# Patient Record
Sex: MALE | Race: WHITE | NOT HISPANIC OR LATINO | URBAN - METROPOLITAN AREA
[De-identification: names, ages, dates, MRNs, and addresses within clinical notes are randomized per-mention and may not be internally consistent; named-entity substitution may affect disease eponyms.]

---

## 2017-04-11 ENCOUNTER — OUTPATIENT (OUTPATIENT)
Dept: OUTPATIENT SERVICES | Facility: HOSPITAL | Age: 82
LOS: 1 days | Discharge: HOME | End: 2017-04-11

## 2017-06-17 ENCOUNTER — INPATIENT (INPATIENT)
Facility: HOSPITAL | Age: 82
LOS: 2 days | Discharge: HOME | End: 2017-06-20
Attending: INTERNAL MEDICINE

## 2017-06-17 DIAGNOSIS — M17.10 UNILATERAL PRIMARY OSTEOARTHRITIS, UNSPECIFIED KNEE: ICD-10-CM
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
## 2017-06-17 DIAGNOSIS — R07.9 CHEST PAIN, UNSPECIFIED: ICD-10-CM

## 2017-06-17 DIAGNOSIS — I50.9 HEART FAILURE, UNSPECIFIED: ICD-10-CM

## 2017-06-17 DIAGNOSIS — K21.9 GASTRO-ESOPHAGEAL REFLUX DISEASE WITHOUT ESOPHAGITIS: ICD-10-CM

## 2017-06-17 DIAGNOSIS — R55 SYNCOPE AND COLLAPSE: ICD-10-CM

## 2017-06-17 DIAGNOSIS — T84.84XA PAIN DUE TO INTERNAL ORTHOPEDIC PROSTHETIC DEVICES, IMPLANTS AND GRAFTS, INITIAL ENCOUNTER: ICD-10-CM

## 2017-06-28 DIAGNOSIS — M19.90 UNSPECIFIED OSTEOARTHRITIS, UNSPECIFIED SITE: ICD-10-CM

## 2017-06-28 DIAGNOSIS — Z79.82 LONG TERM (CURRENT) USE OF ASPIRIN: ICD-10-CM

## 2017-06-28 DIAGNOSIS — M62.82 RHABDOMYOLYSIS: ICD-10-CM

## 2017-06-28 DIAGNOSIS — Z96.649 PRESENCE OF UNSPECIFIED ARTIFICIAL HIP JOINT: ICD-10-CM

## 2017-06-28 DIAGNOSIS — N17.9 ACUTE KIDNEY FAILURE, UNSPECIFIED: ICD-10-CM

## 2017-06-28 DIAGNOSIS — K21.9 GASTRO-ESOPHAGEAL REFLUX DISEASE WITHOUT ESOPHAGITIS: ICD-10-CM

## 2017-06-28 DIAGNOSIS — Z96.652 PRESENCE OF LEFT ARTIFICIAL KNEE JOINT: ICD-10-CM

## 2017-06-28 DIAGNOSIS — M25.562 PAIN IN LEFT KNEE: ICD-10-CM

## 2017-06-28 DIAGNOSIS — I50.9 HEART FAILURE, UNSPECIFIED: ICD-10-CM

## 2017-06-28 DIAGNOSIS — M00.862 ARTHRITIS DUE TO OTHER BACTERIA, LEFT KNEE: ICD-10-CM

## 2017-06-28 DIAGNOSIS — T81.4XXA INFECTION FOLLOWING A PROCEDURE, INITIAL ENCOUNTER: ICD-10-CM

## 2017-06-28 DIAGNOSIS — I25.10 ATHEROSCLEROTIC HEART DISEASE OF NATIVE CORONARY ARTERY WITHOUT ANGINA PECTORIS: ICD-10-CM

## 2017-06-28 DIAGNOSIS — R55 SYNCOPE AND COLLAPSE: ICD-10-CM

## 2017-06-28 DIAGNOSIS — N18.9 CHRONIC KIDNEY DISEASE, UNSPECIFIED: ICD-10-CM

## 2017-06-28 DIAGNOSIS — I13.0 HYPERTENSIVE HEART AND CHRONIC KIDNEY DISEASE WITH HEART FAILURE AND STAGE 1 THROUGH STAGE 4 CHRONIC KIDNEY DISEASE, OR UNSPECIFIED CHRONIC KIDNEY DISEASE: ICD-10-CM

## 2017-06-28 DIAGNOSIS — E78.5 HYPERLIPIDEMIA, UNSPECIFIED: ICD-10-CM

## 2017-06-28 DIAGNOSIS — J44.9 CHRONIC OBSTRUCTIVE PULMONARY DISEASE, UNSPECIFIED: ICD-10-CM

## 2017-07-03 DIAGNOSIS — I50.32 CHRONIC DIASTOLIC (CONGESTIVE) HEART FAILURE: ICD-10-CM

## 2017-07-03 DIAGNOSIS — T84.84XA PAIN DUE TO INTERNAL ORTHOPEDIC PROSTHETIC DEVICES, IMPLANTS AND GRAFTS, INITIAL ENCOUNTER: ICD-10-CM

## 2017-07-03 DIAGNOSIS — T81.4XXA INFECTION FOLLOWING A PROCEDURE, INITIAL ENCOUNTER: ICD-10-CM

## 2017-07-03 DIAGNOSIS — M25.462 EFFUSION, LEFT KNEE: ICD-10-CM

## 2017-07-18 ENCOUNTER — OUTPATIENT (OUTPATIENT)
Dept: OUTPATIENT SERVICES | Facility: HOSPITAL | Age: 82
LOS: 1 days | Discharge: HOME | End: 2017-07-18

## 2017-07-18 DIAGNOSIS — Z96.652 PRESENCE OF LEFT ARTIFICIAL KNEE JOINT: ICD-10-CM

## 2017-07-18 DIAGNOSIS — K21.9 GASTRO-ESOPHAGEAL REFLUX DISEASE WITHOUT ESOPHAGITIS: ICD-10-CM

## 2017-07-18 DIAGNOSIS — M25.662 STIFFNESS OF LEFT KNEE, NOT ELSEWHERE CLASSIFIED: ICD-10-CM

## 2017-07-18 DIAGNOSIS — M17.10 UNILATERAL PRIMARY OSTEOARTHRITIS, UNSPECIFIED KNEE: ICD-10-CM

## 2017-07-18 DIAGNOSIS — R55 SYNCOPE AND COLLAPSE: ICD-10-CM

## 2017-07-18 DIAGNOSIS — T84.84XA PAIN DUE TO INTERNAL ORTHOPEDIC PROSTHETIC DEVICES, IMPLANTS AND GRAFTS, INITIAL ENCOUNTER: ICD-10-CM

## 2017-07-18 DIAGNOSIS — I50.9 HEART FAILURE, UNSPECIFIED: ICD-10-CM

## 2017-07-18 DIAGNOSIS — R07.9 CHEST PAIN, UNSPECIFIED: ICD-10-CM

## 2017-07-26 ENCOUNTER — OUTPATIENT (OUTPATIENT)
Dept: OUTPATIENT SERVICES | Facility: HOSPITAL | Age: 82
LOS: 1 days | Discharge: HOME | End: 2017-07-26

## 2017-07-26 DIAGNOSIS — Z96.652 PRESENCE OF LEFT ARTIFICIAL KNEE JOINT: ICD-10-CM

## 2017-09-19 ENCOUNTER — OUTPATIENT (OUTPATIENT)
Dept: OUTPATIENT SERVICES | Facility: HOSPITAL | Age: 82
LOS: 1 days | Discharge: HOME | End: 2017-09-19

## 2017-09-19 DIAGNOSIS — I50.9 HEART FAILURE, UNSPECIFIED: ICD-10-CM

## 2017-09-19 DIAGNOSIS — K21.9 GASTRO-ESOPHAGEAL REFLUX DISEASE WITHOUT ESOPHAGITIS: ICD-10-CM

## 2017-09-19 DIAGNOSIS — Z00.00 ENCOUNTER FOR GENERAL ADULT MEDICAL EXAMINATION WITHOUT ABNORMAL FINDINGS: ICD-10-CM

## 2017-09-19 DIAGNOSIS — T84.84XA PAIN DUE TO INTERNAL ORTHOPEDIC PROSTHETIC DEVICES, IMPLANTS AND GRAFTS, INITIAL ENCOUNTER: ICD-10-CM

## 2017-09-19 DIAGNOSIS — M17.10 UNILATERAL PRIMARY OSTEOARTHRITIS, UNSPECIFIED KNEE: ICD-10-CM

## 2017-09-19 DIAGNOSIS — R55 SYNCOPE AND COLLAPSE: ICD-10-CM

## 2017-09-19 DIAGNOSIS — R07.9 CHEST PAIN, UNSPECIFIED: ICD-10-CM

## 2017-09-26 ENCOUNTER — OUTPATIENT (OUTPATIENT)
Dept: OUTPATIENT SERVICES | Facility: HOSPITAL | Age: 82
LOS: 1 days | Discharge: HOME | End: 2017-09-26

## 2017-09-26 DIAGNOSIS — D51.0 VITAMIN B12 DEFICIENCY ANEMIA DUE TO INTRINSIC FACTOR DEFICIENCY: ICD-10-CM

## 2017-09-26 DIAGNOSIS — E11.9 TYPE 2 DIABETES MELLITUS WITHOUT COMPLICATIONS: ICD-10-CM

## 2017-09-26 DIAGNOSIS — N39.0 URINARY TRACT INFECTION, SITE NOT SPECIFIED: ICD-10-CM

## 2017-09-26 DIAGNOSIS — R55 SYNCOPE AND COLLAPSE: ICD-10-CM

## 2017-09-26 DIAGNOSIS — E03.9 HYPOTHYROIDISM, UNSPECIFIED: ICD-10-CM

## 2017-09-26 DIAGNOSIS — E78.5 HYPERLIPIDEMIA, UNSPECIFIED: ICD-10-CM

## 2017-09-26 DIAGNOSIS — T84.84XA PAIN DUE TO INTERNAL ORTHOPEDIC PROSTHETIC DEVICES, IMPLANTS AND GRAFTS, INITIAL ENCOUNTER: ICD-10-CM

## 2017-09-26 DIAGNOSIS — I50.9 HEART FAILURE, UNSPECIFIED: ICD-10-CM

## 2017-09-26 DIAGNOSIS — M17.10 UNILATERAL PRIMARY OSTEOARTHRITIS, UNSPECIFIED KNEE: ICD-10-CM

## 2017-09-26 DIAGNOSIS — Z00.00 ENCOUNTER FOR GENERAL ADULT MEDICAL EXAMINATION WITHOUT ABNORMAL FINDINGS: ICD-10-CM

## 2017-09-26 DIAGNOSIS — R07.9 CHEST PAIN, UNSPECIFIED: ICD-10-CM

## 2017-09-26 DIAGNOSIS — D53.9 NUTRITIONAL ANEMIA, UNSPECIFIED: ICD-10-CM

## 2017-09-26 DIAGNOSIS — D51.8 OTHER VITAMIN B12 DEFICIENCY ANEMIAS: ICD-10-CM

## 2017-09-26 DIAGNOSIS — K21.9 GASTRO-ESOPHAGEAL REFLUX DISEASE WITHOUT ESOPHAGITIS: ICD-10-CM

## 2017-11-14 ENCOUNTER — OUTPATIENT (OUTPATIENT)
Dept: OUTPATIENT SERVICES | Facility: HOSPITAL | Age: 82
LOS: 1 days | Discharge: HOME | End: 2017-11-14

## 2017-11-14 DIAGNOSIS — I10 ESSENTIAL (PRIMARY) HYPERTENSION: ICD-10-CM

## 2017-11-14 DIAGNOSIS — Z01.818 ENCOUNTER FOR OTHER PREPROCEDURAL EXAMINATION: ICD-10-CM

## 2017-11-14 DIAGNOSIS — M16.12 UNILATERAL PRIMARY OSTEOARTHRITIS, LEFT HIP: ICD-10-CM

## 2017-11-14 DIAGNOSIS — K21.9 GASTRO-ESOPHAGEAL REFLUX DISEASE WITHOUT ESOPHAGITIS: ICD-10-CM

## 2017-11-14 DIAGNOSIS — I50.9 HEART FAILURE, UNSPECIFIED: ICD-10-CM

## 2017-11-14 DIAGNOSIS — R55 SYNCOPE AND COLLAPSE: ICD-10-CM

## 2017-11-14 DIAGNOSIS — M17.10 UNILATERAL PRIMARY OSTEOARTHRITIS, UNSPECIFIED KNEE: ICD-10-CM

## 2017-11-14 DIAGNOSIS — R07.9 CHEST PAIN, UNSPECIFIED: ICD-10-CM

## 2017-11-14 DIAGNOSIS — I25.10 ATHEROSCLEROTIC HEART DISEASE OF NATIVE CORONARY ARTERY WITHOUT ANGINA PECTORIS: ICD-10-CM

## 2017-11-14 DIAGNOSIS — T84.84XA PAIN DUE TO INTERNAL ORTHOPEDIC PROSTHETIC DEVICES, IMPLANTS AND GRAFTS, INITIAL ENCOUNTER: ICD-10-CM

## 2017-11-14 DIAGNOSIS — J44.9 CHRONIC OBSTRUCTIVE PULMONARY DISEASE, UNSPECIFIED: ICD-10-CM

## 2017-12-05 ENCOUNTER — INPATIENT (INPATIENT)
Facility: HOSPITAL | Age: 82
LOS: 0 days | Discharge: HOME | End: 2017-12-05
Attending: ORTHOPAEDIC SURGERY

## 2017-12-05 DIAGNOSIS — R07.9 CHEST PAIN, UNSPECIFIED: ICD-10-CM

## 2017-12-05 DIAGNOSIS — K21.9 GASTRO-ESOPHAGEAL REFLUX DISEASE WITHOUT ESOPHAGITIS: ICD-10-CM

## 2017-12-05 DIAGNOSIS — I50.9 HEART FAILURE, UNSPECIFIED: ICD-10-CM

## 2017-12-05 DIAGNOSIS — T84.84XA PAIN DUE TO INTERNAL ORTHOPEDIC PROSTHETIC DEVICES, IMPLANTS AND GRAFTS, INITIAL ENCOUNTER: ICD-10-CM

## 2017-12-05 DIAGNOSIS — R55 SYNCOPE AND COLLAPSE: ICD-10-CM

## 2017-12-05 DIAGNOSIS — M17.10 UNILATERAL PRIMARY OSTEOARTHRITIS, UNSPECIFIED KNEE: ICD-10-CM

## 2017-12-11 DIAGNOSIS — Z96.641 PRESENCE OF RIGHT ARTIFICIAL HIP JOINT: ICD-10-CM

## 2017-12-11 DIAGNOSIS — I11.0 HYPERTENSIVE HEART DISEASE WITH HEART FAILURE: ICD-10-CM

## 2017-12-11 DIAGNOSIS — K21.9 GASTRO-ESOPHAGEAL REFLUX DISEASE WITHOUT ESOPHAGITIS: ICD-10-CM

## 2017-12-11 DIAGNOSIS — I25.10 ATHEROSCLEROTIC HEART DISEASE OF NATIVE CORONARY ARTERY WITHOUT ANGINA PECTORIS: ICD-10-CM

## 2017-12-11 DIAGNOSIS — M16.12 UNILATERAL PRIMARY OSTEOARTHRITIS, LEFT HIP: ICD-10-CM

## 2017-12-11 DIAGNOSIS — J44.9 CHRONIC OBSTRUCTIVE PULMONARY DISEASE, UNSPECIFIED: ICD-10-CM

## 2017-12-11 DIAGNOSIS — Z53.8 PROCEDURE AND TREATMENT NOT CARRIED OUT FOR OTHER REASONS: ICD-10-CM

## 2017-12-11 DIAGNOSIS — E78.00 PURE HYPERCHOLESTEROLEMIA, UNSPECIFIED: ICD-10-CM

## 2017-12-11 DIAGNOSIS — I25.2 OLD MYOCARDIAL INFARCTION: ICD-10-CM

## 2017-12-11 DIAGNOSIS — R21 RASH AND OTHER NONSPECIFIC SKIN ERUPTION: ICD-10-CM

## 2017-12-11 DIAGNOSIS — Z86.12 PERSONAL HISTORY OF POLIOMYELITIS: ICD-10-CM

## 2017-12-11 DIAGNOSIS — L08.9 LOCAL INFECTION OF THE SKIN AND SUBCUTANEOUS TISSUE, UNSPECIFIED: ICD-10-CM

## 2017-12-11 DIAGNOSIS — D64.9 ANEMIA, UNSPECIFIED: ICD-10-CM

## 2017-12-11 DIAGNOSIS — I50.32 CHRONIC DIASTOLIC (CONGESTIVE) HEART FAILURE: ICD-10-CM

## 2017-12-11 DIAGNOSIS — Z96.653 PRESENCE OF ARTIFICIAL KNEE JOINT, BILATERAL: ICD-10-CM

## 2017-12-18 ENCOUNTER — OUTPATIENT (OUTPATIENT)
Dept: OUTPATIENT SERVICES | Facility: HOSPITAL | Age: 82
LOS: 1 days | Discharge: HOME | End: 2017-12-18

## 2017-12-18 DIAGNOSIS — M17.10 UNILATERAL PRIMARY OSTEOARTHRITIS, UNSPECIFIED KNEE: ICD-10-CM

## 2017-12-18 DIAGNOSIS — R07.9 CHEST PAIN, UNSPECIFIED: ICD-10-CM

## 2017-12-18 DIAGNOSIS — K21.9 GASTRO-ESOPHAGEAL REFLUX DISEASE WITHOUT ESOPHAGITIS: ICD-10-CM

## 2017-12-18 DIAGNOSIS — T84.84XA PAIN DUE TO INTERNAL ORTHOPEDIC PROSTHETIC DEVICES, IMPLANTS AND GRAFTS, INITIAL ENCOUNTER: ICD-10-CM

## 2017-12-18 DIAGNOSIS — Z00.00 ENCOUNTER FOR GENERAL ADULT MEDICAL EXAMINATION WITHOUT ABNORMAL FINDINGS: ICD-10-CM

## 2017-12-18 DIAGNOSIS — R55 SYNCOPE AND COLLAPSE: ICD-10-CM

## 2017-12-18 DIAGNOSIS — I50.9 HEART FAILURE, UNSPECIFIED: ICD-10-CM

## 2017-12-27 ENCOUNTER — OUTPATIENT (OUTPATIENT)
Dept: OUTPATIENT SERVICES | Facility: HOSPITAL | Age: 82
LOS: 1 days | Discharge: HOME | End: 2017-12-27

## 2017-12-27 DIAGNOSIS — Z01.818 ENCOUNTER FOR OTHER PREPROCEDURAL EXAMINATION: ICD-10-CM

## 2017-12-27 DIAGNOSIS — M16.12 UNILATERAL PRIMARY OSTEOARTHRITIS, LEFT HIP: ICD-10-CM

## 2017-12-27 DIAGNOSIS — M16.2 BILATERAL OSTEOARTHRITIS RESULTING FROM HIP DYSPLASIA: ICD-10-CM

## 2017-12-27 DIAGNOSIS — R07.9 CHEST PAIN, UNSPECIFIED: ICD-10-CM

## 2017-12-27 DIAGNOSIS — I50.9 HEART FAILURE, UNSPECIFIED: ICD-10-CM

## 2017-12-27 DIAGNOSIS — R55 SYNCOPE AND COLLAPSE: ICD-10-CM

## 2017-12-27 DIAGNOSIS — M17.10 UNILATERAL PRIMARY OSTEOARTHRITIS, UNSPECIFIED KNEE: ICD-10-CM

## 2017-12-27 DIAGNOSIS — K21.9 GASTRO-ESOPHAGEAL REFLUX DISEASE WITHOUT ESOPHAGITIS: ICD-10-CM

## 2017-12-27 DIAGNOSIS — T84.84XA PAIN DUE TO INTERNAL ORTHOPEDIC PROSTHETIC DEVICES, IMPLANTS AND GRAFTS, INITIAL ENCOUNTER: ICD-10-CM

## 2018-01-09 ENCOUNTER — INPATIENT (INPATIENT)
Facility: HOSPITAL | Age: 83
LOS: 6 days | Discharge: NURSING, REHAB OR LONG-TERM CARE | End: 2018-01-16
Attending: ORTHOPAEDIC SURGERY

## 2018-01-09 DIAGNOSIS — M17.10 UNILATERAL PRIMARY OSTEOARTHRITIS, UNSPECIFIED KNEE: ICD-10-CM

## 2018-01-09 DIAGNOSIS — T84.84XA PAIN DUE TO INTERNAL ORTHOPEDIC PROSTHETIC DEVICES, IMPLANTS AND GRAFTS, INITIAL ENCOUNTER: ICD-10-CM

## 2018-01-09 DIAGNOSIS — R55 SYNCOPE AND COLLAPSE: ICD-10-CM

## 2018-01-09 DIAGNOSIS — K21.9 GASTRO-ESOPHAGEAL REFLUX DISEASE WITHOUT ESOPHAGITIS: ICD-10-CM

## 2018-01-09 DIAGNOSIS — I50.9 HEART FAILURE, UNSPECIFIED: ICD-10-CM

## 2018-01-09 DIAGNOSIS — R07.9 CHEST PAIN, UNSPECIFIED: ICD-10-CM

## 2018-01-10 DIAGNOSIS — I25.10 ATHEROSCLEROTIC HEART DISEASE OF NATIVE CORONARY ARTERY WITHOUT ANGINA PECTORIS: ICD-10-CM

## 2018-01-10 DIAGNOSIS — I50.9 HEART FAILURE, UNSPECIFIED: ICD-10-CM

## 2018-01-10 DIAGNOSIS — J30.9 ALLERGIC RHINITIS, UNSPECIFIED: ICD-10-CM

## 2018-01-10 DIAGNOSIS — Z45.9 ENCOUNTER FOR ADJUSTMENT AND MANAGEMENT OF UNSPECIFIED IMPLANTED DEVICE: ICD-10-CM

## 2018-01-10 DIAGNOSIS — J44.9 CHRONIC OBSTRUCTIVE PULMONARY DISEASE, UNSPECIFIED: ICD-10-CM

## 2018-01-10 DIAGNOSIS — M10.9 GOUT, UNSPECIFIED: ICD-10-CM

## 2018-01-10 DIAGNOSIS — I49.9 CARDIAC ARRHYTHMIA, UNSPECIFIED: ICD-10-CM

## 2018-01-10 DIAGNOSIS — N43.3 HYDROCELE, UNSPECIFIED: ICD-10-CM

## 2018-01-17 ENCOUNTER — OUTPATIENT (OUTPATIENT)
Dept: OUTPATIENT SERVICES | Facility: HOSPITAL | Age: 83
LOS: 1 days | Discharge: HOME | End: 2018-01-17

## 2018-01-17 DIAGNOSIS — D64.9 ANEMIA, UNSPECIFIED: ICD-10-CM

## 2018-01-17 DIAGNOSIS — R79.9 ABNORMAL FINDING OF BLOOD CHEMISTRY, UNSPECIFIED: ICD-10-CM

## 2018-01-17 DIAGNOSIS — K21.9 GASTRO-ESOPHAGEAL REFLUX DISEASE WITHOUT ESOPHAGITIS: ICD-10-CM

## 2018-01-17 DIAGNOSIS — I50.9 HEART FAILURE, UNSPECIFIED: ICD-10-CM

## 2018-01-17 DIAGNOSIS — R55 SYNCOPE AND COLLAPSE: ICD-10-CM

## 2018-01-17 DIAGNOSIS — M17.10 UNILATERAL PRIMARY OSTEOARTHRITIS, UNSPECIFIED KNEE: ICD-10-CM

## 2018-01-17 DIAGNOSIS — T84.84XA PAIN DUE TO INTERNAL ORTHOPEDIC PROSTHETIC DEVICES, IMPLANTS AND GRAFTS, INITIAL ENCOUNTER: ICD-10-CM

## 2018-01-17 DIAGNOSIS — R07.9 CHEST PAIN, UNSPECIFIED: ICD-10-CM

## 2018-01-23 DIAGNOSIS — M16.12 UNILATERAL PRIMARY OSTEOARTHRITIS, LEFT HIP: ICD-10-CM

## 2018-01-23 DIAGNOSIS — I95.1 ORTHOSTATIC HYPOTENSION: ICD-10-CM

## 2018-01-23 DIAGNOSIS — E78.00 PURE HYPERCHOLESTEROLEMIA, UNSPECIFIED: ICD-10-CM

## 2018-01-23 DIAGNOSIS — G47.33 OBSTRUCTIVE SLEEP APNEA (ADULT) (PEDIATRIC): ICD-10-CM

## 2018-01-23 DIAGNOSIS — I25.2 OLD MYOCARDIAL INFARCTION: ICD-10-CM

## 2018-01-23 DIAGNOSIS — Z96.653 PRESENCE OF ARTIFICIAL KNEE JOINT, BILATERAL: ICD-10-CM

## 2018-01-23 DIAGNOSIS — Z96.0 PRESENCE OF UROGENITAL IMPLANTS: ICD-10-CM

## 2018-01-23 DIAGNOSIS — D64.9 ANEMIA, UNSPECIFIED: ICD-10-CM

## 2018-01-23 DIAGNOSIS — I50.32 CHRONIC DIASTOLIC (CONGESTIVE) HEART FAILURE: ICD-10-CM

## 2018-01-23 DIAGNOSIS — K21.9 GASTRO-ESOPHAGEAL REFLUX DISEASE WITHOUT ESOPHAGITIS: ICD-10-CM

## 2018-01-23 DIAGNOSIS — I25.10 ATHEROSCLEROTIC HEART DISEASE OF NATIVE CORONARY ARTERY WITHOUT ANGINA PECTORIS: ICD-10-CM

## 2018-01-23 DIAGNOSIS — I11.0 HYPERTENSIVE HEART DISEASE WITH HEART FAILURE: ICD-10-CM

## 2018-01-23 DIAGNOSIS — Z96.641 PRESENCE OF RIGHT ARTIFICIAL HIP JOINT: ICD-10-CM

## 2018-01-23 DIAGNOSIS — Z86.12 PERSONAL HISTORY OF POLIOMYELITIS: ICD-10-CM

## 2018-01-23 DIAGNOSIS — J44.9 CHRONIC OBSTRUCTIVE PULMONARY DISEASE, UNSPECIFIED: ICD-10-CM

## 2018-02-23 PROBLEM — Z00.00 ENCOUNTER FOR PREVENTIVE HEALTH EXAMINATION: Status: ACTIVE | Noted: 2018-02-23

## 2018-03-06 ENCOUNTER — OUTPATIENT (OUTPATIENT)
Dept: OUTPATIENT SERVICES | Facility: HOSPITAL | Age: 83
LOS: 1 days | Discharge: HOME | End: 2018-03-06

## 2018-03-06 DIAGNOSIS — Z96.642 PRESENCE OF LEFT ARTIFICIAL HIP JOINT: ICD-10-CM

## 2018-05-30 ENCOUNTER — OUTPATIENT (OUTPATIENT)
Dept: OUTPATIENT SERVICES | Facility: HOSPITAL | Age: 83
LOS: 1 days | Discharge: HOME | End: 2018-05-30

## 2018-05-30 DIAGNOSIS — Z96.652 PRESENCE OF LEFT ARTIFICIAL KNEE JOINT: ICD-10-CM

## 2018-07-02 DIAGNOSIS — Z01.818 ENCOUNTER FOR OTHER PREPROCEDURAL EXAMINATION: ICD-10-CM

## 2018-07-02 DIAGNOSIS — I10 ESSENTIAL (PRIMARY) HYPERTENSION: ICD-10-CM

## 2018-07-02 DIAGNOSIS — E66.9 OBESITY, UNSPECIFIED: ICD-10-CM

## 2018-07-02 DIAGNOSIS — Z45.9 ENCOUNTER FOR ADJUSTMENT AND MANAGEMENT OF UNSPECIFIED IMPLANTED DEVICE: ICD-10-CM

## 2018-07-02 DIAGNOSIS — I25.10 ATHEROSCLEROTIC HEART DISEASE OF NATIVE CORONARY ARTERY WITHOUT ANGINA PECTORIS: ICD-10-CM

## 2018-07-02 DIAGNOSIS — J44.9 CHRONIC OBSTRUCTIVE PULMONARY DISEASE, UNSPECIFIED: ICD-10-CM

## 2018-07-02 DIAGNOSIS — Z95.5 PRESENCE OF CORONARY ANGIOPLASTY IMPLANT AND GRAFT: ICD-10-CM

## 2018-07-02 DIAGNOSIS — K21.9 GASTRO-ESOPHAGEAL REFLUX DISEASE WITHOUT ESOPHAGITIS: ICD-10-CM

## 2018-07-31 ENCOUNTER — OUTPATIENT (OUTPATIENT)
Dept: OUTPATIENT SERVICES | Facility: HOSPITAL | Age: 83
LOS: 1 days | Discharge: HOME | End: 2018-07-31

## 2018-07-31 DIAGNOSIS — N39.0 URINARY TRACT INFECTION, SITE NOT SPECIFIED: ICD-10-CM

## 2018-07-31 DIAGNOSIS — E03.9 HYPOTHYROIDISM, UNSPECIFIED: ICD-10-CM

## 2018-07-31 DIAGNOSIS — Z00.00 ENCOUNTER FOR GENERAL ADULT MEDICAL EXAMINATION WITHOUT ABNORMAL FINDINGS: ICD-10-CM

## 2018-07-31 DIAGNOSIS — E78.5 HYPERLIPIDEMIA, UNSPECIFIED: ICD-10-CM

## 2018-07-31 DIAGNOSIS — E11.9 TYPE 2 DIABETES MELLITUS WITHOUT COMPLICATIONS: ICD-10-CM

## 2018-07-31 DIAGNOSIS — D53.9 NUTRITIONAL ANEMIA, UNSPECIFIED: ICD-10-CM

## 2018-12-20 ENCOUNTER — OUTPATIENT (OUTPATIENT)
Dept: OUTPATIENT SERVICES | Facility: HOSPITAL | Age: 83
LOS: 1 days | Discharge: HOME | End: 2018-12-20

## 2018-12-20 DIAGNOSIS — E03.9 HYPOTHYROIDISM, UNSPECIFIED: ICD-10-CM

## 2018-12-20 DIAGNOSIS — N39.0 URINARY TRACT INFECTION, SITE NOT SPECIFIED: ICD-10-CM

## 2018-12-20 DIAGNOSIS — Z00.00 ENCOUNTER FOR GENERAL ADULT MEDICAL EXAMINATION WITHOUT ABNORMAL FINDINGS: ICD-10-CM

## 2018-12-20 DIAGNOSIS — E55.9 VITAMIN D DEFICIENCY, UNSPECIFIED: ICD-10-CM

## 2018-12-20 DIAGNOSIS — D53.9 NUTRITIONAL ANEMIA, UNSPECIFIED: ICD-10-CM

## 2018-12-20 DIAGNOSIS — E78.5 HYPERLIPIDEMIA, UNSPECIFIED: ICD-10-CM

## 2018-12-20 DIAGNOSIS — Z13.1 ENCOUNTER FOR SCREENING FOR DIABETES MELLITUS: ICD-10-CM

## 2019-07-18 ENCOUNTER — EMERGENCY (EMERGENCY)
Facility: HOSPITAL | Age: 84
LOS: 0 days | Discharge: HOME | End: 2019-07-18
Attending: EMERGENCY MEDICINE | Admitting: EMERGENCY MEDICINE
Payer: MEDICARE

## 2019-07-18 VITALS
SYSTOLIC BLOOD PRESSURE: 194 MMHG | OXYGEN SATURATION: 97 % | HEART RATE: 75 BPM | RESPIRATION RATE: 16 BRPM | DIASTOLIC BLOOD PRESSURE: 94 MMHG

## 2019-07-18 VITALS
HEART RATE: 77 BPM | WEIGHT: 182.98 LBS | DIASTOLIC BLOOD PRESSURE: 104 MMHG | TEMPERATURE: 97 F | SYSTOLIC BLOOD PRESSURE: 173 MMHG | RESPIRATION RATE: 16 BRPM | HEIGHT: 63 IN | OXYGEN SATURATION: 97 %

## 2019-07-18 DIAGNOSIS — M25.529 PAIN IN UNSPECIFIED ELBOW: ICD-10-CM

## 2019-07-18 DIAGNOSIS — I50.9 HEART FAILURE, UNSPECIFIED: ICD-10-CM

## 2019-07-18 DIAGNOSIS — Z88.5 ALLERGY STATUS TO NARCOTIC AGENT: ICD-10-CM

## 2019-07-18 DIAGNOSIS — Z79.899 OTHER LONG TERM (CURRENT) DRUG THERAPY: ICD-10-CM

## 2019-07-18 DIAGNOSIS — M70.22 OLECRANON BURSITIS, LEFT ELBOW: ICD-10-CM

## 2019-07-18 DIAGNOSIS — Z96.653 PRESENCE OF ARTIFICIAL KNEE JOINT, BILATERAL: Chronic | ICD-10-CM

## 2019-07-18 DIAGNOSIS — Z96.643 PRESENCE OF ARTIFICIAL HIP JOINT, BILATERAL: Chronic | ICD-10-CM

## 2019-07-18 DIAGNOSIS — E03.9 HYPOTHYROIDISM, UNSPECIFIED: ICD-10-CM

## 2019-07-18 DIAGNOSIS — I11.0 HYPERTENSIVE HEART DISEASE WITH HEART FAILURE: ICD-10-CM

## 2019-07-18 PROCEDURE — 99282 EMERGENCY DEPT VISIT SF MDM: CPT

## 2019-07-18 NOTE — ED PROVIDER NOTE - CLINICAL SUMMARY MEDICAL DECISION MAKING FREE TEXT BOX
Pt with non painful swelling of olecranon bursa. No overlying erythema. rom intact. suitable for outpatient management with orthopedics. Pt voiced understanding and agrees with plan. Feels and appears well. No complaints at present. Eager to leave. Stable for discharge. Patient was given strict return and follow up precautions. The patient has been informed of all concerning signs and symptoms to return to Emergency Department, the necessity to follow up with PMD/Clinic/follow up provided within 2-3 days was explained, and the patient reports understanding of above with capacity and insight.

## 2019-07-18 NOTE — ED PROVIDER NOTE - CARE PROVIDER_API CALL
Nick Hess (MD)  Orthopaedic Surgery  [address] [place] [address]  Phone: [phone]  Fax: [phone]  Follow Up Time:

## 2019-07-18 NOTE — ED PROVIDER NOTE - PROGRESS NOTE DETAILS
Patient with olecranon bursitis, not impeding ROM, no suspicion for septic joint, will refer to Ortho for drainage

## 2019-07-18 NOTE — ED PROVIDER NOTE - OBJECTIVE STATEMENT
84yo male with PMHx CHF, HTN, bilateral knee replacements presents c/o L elbow swelling x 2 weeks, no relief with compression bandage. Mild discomfort, constant, without aggravating or relieving factors. Patient reports repetitive use of L elbow when trying to exit in bed in the morning. Patient denies fever, chills, or expanding redness. Reports FROM L elbow. Denies any precipitating trauma

## 2019-07-18 NOTE — ED ADULT NURSE NOTE - NSIMPLEMENTINTERV_GEN_ALL_ED
Implemented All Universal Safety Interventions:  [place] to call system. Call bell, personal items and telephone within reach. Instruct patient to call for assistance. Room bathroom lighting operational. Non-slip footwear when patient is off stretcher. Physically safe environment: no spills, clutter or unnecessary equipment. Stretcher in lowest position, wheels locked, appropriate side rails in place.

## 2019-07-18 NOTE — ED PROVIDER NOTE - NS ED ROS FT
CONST: No fever, chills or bodyaches  CARD: No chest pain, palpitations  RESP: No SOB, cough  MS: L elbow swelling  SKIN: No rashes  NEURO: No  paresthesias

## 2019-07-18 NOTE — ED PROVIDER NOTE - NSFOLLOWUPINSTRUCTIONS_ED_ALL_ED_FT
ELBOW BURSITIS - [facility](R) Instructions(ER/ED)     Elbow Bursitis    WHAT YOU NEED TO KNOW:    Elbow bursitis is inflammation of the bursa in your elbow. The bursa is a fluid-filled sac that acts as a cushion between a bone and a tendon. A tendon is a cord of strong tissue that connects muscles to bones. The bursa is located right under the point of your elbow.    DISCHARGE INSTRUCTIONS:    Medicines:     NSAIDs: These medicines decrease swelling, pain, and fever. NSAIDs are available without a doctor's order. Ask your healthcare provider which medicine is right for you. Ask how much to take and when to take it. Take as directed. NSAIDs can cause stomach bleeding and kidney problems if not taken correctly.      Antibiotics: These help fight an infection caused by bacteria. You may need antibiotics if your bursitis is caused by infection.       Take your medicine as directed. Contact your healthcare provider if you think your medicine is not helping or if you have side effects. Tell him of her if you are allergic to any medicine. Keep a list of the medicines, vitamins, and herbs you take. Include the amounts, and when and why you take them. Bring the list or the pill bottles to follow-up visits. Carry your medicine list with you in case of an emergency.    Manage your symptoms:     Rest: Rest your elbow as much as possible to decrease pain and swelling. Slowly start to do more each day. Return to your daily activities as directed.       Ice: Ice helps decrease swelling and pain. Ice may also help prevent tissue damage. Use an ice pack, or put crushed ice in a plastic bag. Cover it with a towel and place it on your elbow for 15 to 20 minutes, 3 to 4 times each day, as directed.      Compress: Healthcare providers may wrap your arm with tape or an elastic bandage to decrease swelling. Loosen the elastic bandage if you start to lose feeling in your fingers.      Elevate: Raise your elbow above the level of your heart as often as you can. This will help decrease swelling and pain. Prop your elbow on pillows or blankets to keep it elevated comfortably.     Physical therapy: A physical therapist teaches you exercises to help improve movement and strength, and to decrease pain.     Prevent another elbow injury:     Avoid injury and pressure to your elbows: Wear elbow pads or protectors when you play sports. Do not lean on your elbows or clench your fists. Do not tightly  small items, such as tools or pens.       Stretch, warm up, and cool down: Always stretch and do warmup and cool-down exercises before and after you exercise. This will help loosen your muscles and decrease stress on your elbow. Rest between workouts.    Follow up with your healthcare provider as directed: Write down your questions so you remember to ask them during your visits.     Contact your healthcare provider if:     Your pain and swelling increase.      Your symptoms do not improve after 10 days of treatment.      You have a fever.      You have questions or concerns about your condition or care.

## 2019-07-18 NOTE — ED PROVIDER NOTE - PHYSICAL EXAMINATION
CONST: Well appearing in NAD  MS: L olecranon bursitis. FROM. No warmth.  SKIN: Warm, dry, no acute rashes. Good turgor  NEURO: A&Ox3, No focal deficits. Strength 5/5 with no sensory deficits.

## 2019-07-18 NOTE — ED ADULT TRIAGE NOTE - CHIEF COMPLAINT QUOTE
left elbow pain and swelling. Pt. states: "I have bursitis. It needed to be drained.' left elbow swelling. Pt. states: "I have bursitis. It needed to be drained.'

## 2019-07-19 NOTE — ED PROVIDER NOTE - DISPOSITION TYPE
Subjective:       Patient ID: Iván Amaral is a 36 y.o. male.    Chief Complaint: Anxiety (discuss medications ) and Gout (follow up from Dr. Urgent Care )      Patient is here as a follow-up from the urgent care he was seen for an acute gout flareup also suffers with chronic anxiety and is here for follow-up on that as well.  Gout started as pain across great toe one week ago. Became excruciating by the 2 days after the onset and visible redness and swelling on the top of the foot painful weightbearing.  To colchicine for 6 days nose helped quite a bit. Uric acid was checked was 12.33. Patient is leaving for [place] for a convention will be doing a lot of walking. Patient is interested in taking ill-appearing all to prevent future attacks.    Anxiety   Presents for follow-up visit. Patient reports no chest pain, compulsions, confusion, decreased concentration, depressed mood, dizziness, dry mouth, feeling of choking, hyperventilation, insomnia, irritability, malaise, muscle tension, nausea, nervous/anxious behavior, palpitations, panic, restlessness, shortness of breath or suicidal ideas.           Allergies and Medications:   Review of patient's allergies indicates:   Allergen Reactions    Pcn [penicillins]      Current Outpatient Medications   Medication Sig Dispense Refill    citalopram (CELEXA) 20 MG tablet Take 1 tablet (20 mg total) by mouth once daily. 90 tablet 1    esomeprazole (NEXIUM) 40 MG capsule Take 1 capsule (40 mg total) by mouth before breakfast. Patient failed a six-month course of omeprazole 30 capsule 6    indomethacin (INDOCIN) 50 MG capsule Take 50 mg by mouth once daily.   0    multivitamin (ONE DAILY MULTIVITAMIN) per tablet Take 1 tablet by mouth once daily.      pravastatin (PRAVACHOL) 10 MG tablet Take 1 tablet (10 mg total) by mouth once daily. 90 tablet 3    allopurinol (ZYLOPRIM) 300 MG tablet Take 1 tablet (300 mg total) by mouth once daily. 90 tablet 3     citalopram (CELEXA) 40 MG tablet Take 1 tablet (40 mg total) by mouth once daily. 90 tablet 3     No current facility-administered medications for this visit.        Family History:   Family History   Problem Relation Age of Onset    Diabetes Mother     Hypothyroidism Mother     Diabetes Father     Gout Father     Heart failure Father     Heart disease Father     Hypoglycemic Brother     Cancer Maternal Grandfather        Social History:   Social History     Socioeconomic History    Marital status: Single     Spouse name: Not on file    Number of children: Not on file    Years of education: Not on file    Highest education level: Not on file   Occupational History    Not on file   Social Needs    Financial resource strain: Not on file    Food insecurity:     Worry: Not on file     Inability: Not on file    Transportation needs:     Medical: Not on file     Non-medical: Not on file   Tobacco Use    Smoking status: Never Smoker    Smokeless tobacco: Never Used   Substance and Sexual Activity    Alcohol use: No    Drug use: No    Sexual activity: Not Currently   Lifestyle    Physical activity:     Days per week: Not on file     Minutes per session: Not on file    Stress: To some extent   Relationships    Social connections:     Talks on phone: Not on file     Gets together: Not on file     Attends Restorationist service: Not on file     Active member of club or organization: Not on file     Attends meetings of clubs or organizations: Not on file     Relationship status: Not on file   Other Topics Concern    Not on file   Social History Narrative    Not on file       Review of Systems   Constitutional: Negative for irritability.   Respiratory: Negative for shortness of breath.    Cardiovascular: Negative for chest pain and palpitations.   Gastrointestinal: Negative for nausea.   Neurological: Negative for dizziness.   Psychiatric/Behavioral: Negative for confusion, decreased concentration and  suicidal ideas. The patient is not nervous/anxious and does not have insomnia.        Objective:     Vitals:    07/19/19 0934   BP: 112/80   Pulse: 72   Resp: 16   Temp: 98.3 °F (36.8 °C)        Physical Exam   Musculoskeletal:        Feet:        Assessment:       1. 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 Depression with anxiety        Plan:       Iván was seen today for anxiety and gout.    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  -     Basic metabolic panel; Future  -     CBC auto differential; Future  -     C-reactive protein; Future  -     Uric acid; Future  -     Basic metabolic panel  -     CBC auto differential  -     C-reactive protein  -     Uric acid    Depression with anxiety  -     citalopram (CELEXA) 40 MG tablet; Take 1 tablet (40 mg total) by mouth once daily.         Follow up in about 2 months (around 9/19/2019).   DISCHARGE

## 2019-11-07 ENCOUNTER — OUTPATIENT (OUTPATIENT)
Dept: OUTPATIENT SERVICES | Facility: HOSPITAL | Age: 84
LOS: 1 days | Discharge: HOME | End: 2019-11-07

## 2019-11-07 DIAGNOSIS — Z96.653 PRESENCE OF ARTIFICIAL KNEE JOINT, BILATERAL: Chronic | ICD-10-CM

## 2019-11-07 DIAGNOSIS — E03.9 HYPOTHYROIDISM, UNSPECIFIED: ICD-10-CM

## 2019-11-07 DIAGNOSIS — Z96.643 PRESENCE OF ARTIFICIAL HIP JOINT, BILATERAL: Chronic | ICD-10-CM

## 2019-11-07 DIAGNOSIS — D53.9 NUTRITIONAL ANEMIA, UNSPECIFIED: ICD-10-CM

## 2019-11-07 DIAGNOSIS — D51.1 VITAMIN B12 DEFICIENCY ANEMIA DUE TO SELECTIVE VITAMIN B12 MALABSORPTION WITH PROTEINURIA: ICD-10-CM

## 2019-11-07 PROBLEM — I50.9 HEART FAILURE, UNSPECIFIED: Chronic | Status: ACTIVE | Noted: 2019-07-18

## 2019-12-05 ENCOUNTER — OUTPATIENT (OUTPATIENT)
Dept: OUTPATIENT SERVICES | Facility: HOSPITAL | Age: 84
LOS: 1 days | Discharge: HOME | End: 2019-12-05

## 2019-12-05 ENCOUNTER — RESULT REVIEW (OUTPATIENT)
Age: 84
End: 2019-12-05

## 2019-12-05 DIAGNOSIS — E03.9 HYPOTHYROIDISM, UNSPECIFIED: ICD-10-CM

## 2019-12-05 DIAGNOSIS — E55.9 VITAMIN D DEFICIENCY, UNSPECIFIED: ICD-10-CM

## 2019-12-05 DIAGNOSIS — Z96.643 PRESENCE OF ARTIFICIAL HIP JOINT, BILATERAL: Chronic | ICD-10-CM

## 2019-12-05 DIAGNOSIS — N39.0 URINARY TRACT INFECTION, SITE NOT SPECIFIED: ICD-10-CM

## 2019-12-05 DIAGNOSIS — E11.8 TYPE 2 DIABETES MELLITUS WITH UNSPECIFIED COMPLICATIONS: ICD-10-CM

## 2019-12-05 DIAGNOSIS — N18.3 CHRONIC KIDNEY DISEASE, STAGE 3 (MODERATE): ICD-10-CM

## 2019-12-05 DIAGNOSIS — Z00.00 ENCOUNTER FOR GENERAL ADULT MEDICAL EXAMINATION WITHOUT ABNORMAL FINDINGS: ICD-10-CM

## 2019-12-05 DIAGNOSIS — R97.20 ELEVATED PROSTATE SPECIFIC ANTIGEN [PSA]: ICD-10-CM

## 2019-12-05 DIAGNOSIS — D51.9 VITAMIN B12 DEFICIENCY ANEMIA, UNSPECIFIED: ICD-10-CM

## 2019-12-05 DIAGNOSIS — E78.5 HYPERLIPIDEMIA, UNSPECIFIED: ICD-10-CM

## 2019-12-05 DIAGNOSIS — D64.9 ANEMIA, UNSPECIFIED: ICD-10-CM

## 2019-12-05 DIAGNOSIS — Z96.653 PRESENCE OF ARTIFICIAL KNEE JOINT, BILATERAL: Chronic | ICD-10-CM

## 2020-01-11 ENCOUNTER — OUTPATIENT (OUTPATIENT)
Dept: OUTPATIENT SERVICES | Facility: HOSPITAL | Age: 85
LOS: 1 days | Discharge: HOME | End: 2020-01-11
Payer: MEDICARE

## 2020-01-11 DIAGNOSIS — Z96.653 PRESENCE OF ARTIFICIAL KNEE JOINT, BILATERAL: Chronic | ICD-10-CM

## 2020-01-11 DIAGNOSIS — J44.9 CHRONIC OBSTRUCTIVE PULMONARY DISEASE, UNSPECIFIED: ICD-10-CM

## 2020-01-11 DIAGNOSIS — Z96.643 PRESENCE OF ARTIFICIAL HIP JOINT, BILATERAL: Chronic | ICD-10-CM

## 2020-01-11 PROCEDURE — 71046 X-RAY EXAM CHEST 2 VIEWS: CPT | Mod: 26

## 2020-01-16 ENCOUNTER — INPATIENT (INPATIENT)
Facility: HOSPITAL | Age: 85
LOS: 4 days | Discharge: HOME | End: 2020-01-21
Attending: INTERNAL MEDICINE | Admitting: INTERNAL MEDICINE
Payer: MEDICARE

## 2020-01-16 VITALS
WEIGHT: 190.04 LBS | SYSTOLIC BLOOD PRESSURE: 135 MMHG | DIASTOLIC BLOOD PRESSURE: 70 MMHG | HEIGHT: 62 IN | HEART RATE: 102 BPM | OXYGEN SATURATION: 98 % | RESPIRATION RATE: 18 BRPM

## 2020-01-16 DIAGNOSIS — Z96.643 PRESENCE OF ARTIFICIAL HIP JOINT, BILATERAL: Chronic | ICD-10-CM

## 2020-01-16 DIAGNOSIS — Z98.890 OTHER SPECIFIED POSTPROCEDURAL STATES: Chronic | ICD-10-CM

## 2020-01-16 DIAGNOSIS — Z96.653 PRESENCE OF ARTIFICIAL KNEE JOINT, BILATERAL: Chronic | ICD-10-CM

## 2020-01-16 LAB
ALBUMIN SERPL ELPH-MCNC: 4.4 G/DL — SIGNIFICANT CHANGE UP (ref 3.5–5.2)
ALP SERPL-CCNC: 102 U/L — SIGNIFICANT CHANGE UP (ref 30–115)
ALT FLD-CCNC: 25 U/L — SIGNIFICANT CHANGE UP (ref 0–41)
ANION GAP SERPL CALC-SCNC: 16 MMOL/L — HIGH (ref 7–14)
APTT BLD: 32.7 SEC — SIGNIFICANT CHANGE UP (ref 27–39.2)
AST SERPL-CCNC: 26 U/L — SIGNIFICANT CHANGE UP (ref 0–41)
BASE EXCESS BLDV CALC-SCNC: -0.9 MMOL/L — SIGNIFICANT CHANGE UP (ref -2–2)
BASOPHILS # BLD AUTO: 0.03 K/UL — SIGNIFICANT CHANGE UP (ref 0–0.2)
BASOPHILS NFR BLD AUTO: 0.5 % — SIGNIFICANT CHANGE UP (ref 0–1)
BILIRUB SERPL-MCNC: 0.5 MG/DL — SIGNIFICANT CHANGE UP (ref 0.2–1.2)
BUN SERPL-MCNC: 27 MG/DL — HIGH (ref 10–20)
CA-I SERPL-SCNC: 1.14 MMOL/L — SIGNIFICANT CHANGE UP (ref 1.12–1.3)
CALCIUM SERPL-MCNC: 9.1 MG/DL — SIGNIFICANT CHANGE UP (ref 8.5–10.1)
CHLORIDE SERPL-SCNC: 103 MMOL/L — SIGNIFICANT CHANGE UP (ref 98–110)
CO2 SERPL-SCNC: 22 MMOL/L — SIGNIFICANT CHANGE UP (ref 17–32)
CREAT SERPL-MCNC: 1.8 MG/DL — HIGH (ref 0.7–1.5)
EOSINOPHIL # BLD AUTO: 0.15 K/UL — SIGNIFICANT CHANGE UP (ref 0–0.7)
EOSINOPHIL NFR BLD AUTO: 2.3 % — SIGNIFICANT CHANGE UP (ref 0–8)
FLU A RESULT: NEGATIVE — SIGNIFICANT CHANGE UP
FLU A RESULT: NEGATIVE — SIGNIFICANT CHANGE UP
FLUAV AG NPH QL: NEGATIVE — SIGNIFICANT CHANGE UP
FLUBV AG NPH QL: NEGATIVE — SIGNIFICANT CHANGE UP
GAS PNL BLDV: 142 MMOL/L — SIGNIFICANT CHANGE UP (ref 136–145)
GAS PNL BLDV: SIGNIFICANT CHANGE UP
GLUCOSE SERPL-MCNC: 142 MG/DL — HIGH (ref 70–99)
HCO3 BLDV-SCNC: 25 MMOL/L — SIGNIFICANT CHANGE UP (ref 22–29)
HCT VFR BLD CALC: 50.2 % — SIGNIFICANT CHANGE UP (ref 42–52)
HCT VFR BLDA CALC: 51.8 % — HIGH (ref 34–44)
HGB BLD CALC-MCNC: 16.9 G/DL — SIGNIFICANT CHANGE UP (ref 14–18)
HGB BLD-MCNC: 16.4 G/DL — SIGNIFICANT CHANGE UP (ref 14–18)
HOROWITZ INDEX BLDV+IHG-RTO: 21 — SIGNIFICANT CHANGE UP
IMM GRANULOCYTES NFR BLD AUTO: 0.2 % — SIGNIFICANT CHANGE UP (ref 0.1–0.3)
INR BLD: 1.04 RATIO — SIGNIFICANT CHANGE UP (ref 0.65–1.3)
LACTATE BLDV-MCNC: 2.1 MMOL/L — HIGH (ref 0.5–1.6)
LYMPHOCYTES # BLD AUTO: 1.48 K/UL — SIGNIFICANT CHANGE UP (ref 1.2–3.4)
LYMPHOCYTES # BLD AUTO: 22.6 % — SIGNIFICANT CHANGE UP (ref 20.5–51.1)
MAGNESIUM SERPL-MCNC: 2.2 MG/DL — SIGNIFICANT CHANGE UP (ref 1.8–2.4)
MCHC RBC-ENTMCNC: 26.5 PG — LOW (ref 27–31)
MCHC RBC-ENTMCNC: 32.7 G/DL — SIGNIFICANT CHANGE UP (ref 32–37)
MCV RBC AUTO: 81.2 FL — SIGNIFICANT CHANGE UP (ref 80–94)
MONOCYTES # BLD AUTO: 0.76 K/UL — HIGH (ref 0.1–0.6)
MONOCYTES NFR BLD AUTO: 11.6 % — HIGH (ref 1.7–9.3)
NEUTROPHILS # BLD AUTO: 4.12 K/UL — SIGNIFICANT CHANGE UP (ref 1.4–6.5)
NEUTROPHILS NFR BLD AUTO: 62.8 % — SIGNIFICANT CHANGE UP (ref 42.2–75.2)
NRBC # BLD: 0 /100 WBCS — SIGNIFICANT CHANGE UP (ref 0–0)
NT-PROBNP SERPL-SCNC: 110 PG/ML — SIGNIFICANT CHANGE UP (ref 0–300)
PCO2 BLDV: 44 MMHG — SIGNIFICANT CHANGE UP (ref 41–51)
PH BLDV: 7.36 — SIGNIFICANT CHANGE UP (ref 7.26–7.43)
PLATELET # BLD AUTO: 194 K/UL — SIGNIFICANT CHANGE UP (ref 130–400)
PO2 BLDV: 34 MMHG — SIGNIFICANT CHANGE UP (ref 20–40)
POTASSIUM BLDV-SCNC: 4 MMOL/L — SIGNIFICANT CHANGE UP (ref 3.3–5.6)
POTASSIUM SERPL-MCNC: 4.2 MMOL/L — SIGNIFICANT CHANGE UP (ref 3.5–5)
POTASSIUM SERPL-SCNC: 4.2 MMOL/L — SIGNIFICANT CHANGE UP (ref 3.5–5)
PROT SERPL-MCNC: 7.7 G/DL — SIGNIFICANT CHANGE UP (ref 6–8)
PROTHROM AB SERPL-ACNC: 12 SEC — SIGNIFICANT CHANGE UP (ref 9.95–12.87)
RBC # BLD: 6.18 M/UL — HIGH (ref 4.7–6.1)
RBC # FLD: 16.1 % — HIGH (ref 11.5–14.5)
RSV RESULT: POSITIVE
RSV RNA RESP QL NAA+PROBE: POSITIVE
SAO2 % BLDV: 64 % — SIGNIFICANT CHANGE UP
SODIUM SERPL-SCNC: 141 MMOL/L — SIGNIFICANT CHANGE UP (ref 135–146)
TROPONIN T SERPL-MCNC: <0.01 NG/ML — SIGNIFICANT CHANGE UP
WBC # BLD: 6.55 K/UL — SIGNIFICANT CHANGE UP (ref 4.8–10.8)
WBC # FLD AUTO: 6.55 K/UL — SIGNIFICANT CHANGE UP (ref 4.8–10.8)

## 2020-01-16 PROCEDURE — 99222 1ST HOSP IP/OBS MODERATE 55: CPT

## 2020-01-16 PROCEDURE — 99291 CRITICAL CARE FIRST HOUR: CPT

## 2020-01-16 PROCEDURE — 71045 X-RAY EXAM CHEST 1 VIEW: CPT | Mod: 26

## 2020-01-16 PROCEDURE — 99223 1ST HOSP IP/OBS HIGH 75: CPT

## 2020-01-16 RX ORDER — IPRATROPIUM/ALBUTEROL SULFATE 18-103MCG
3 AEROSOL WITH ADAPTER (GRAM) INHALATION EVERY 6 HOURS
Refills: 0 | Status: DISCONTINUED | OUTPATIENT
Start: 2020-01-16 | End: 2020-01-21

## 2020-01-16 RX ORDER — ACETAMINOPHEN 500 MG
650 TABLET ORAL EVERY 6 HOURS
Refills: 0 | Status: DISCONTINUED | OUTPATIENT
Start: 2020-01-16 | End: 2020-01-21

## 2020-01-16 RX ORDER — FUROSEMIDE 40 MG
1 TABLET ORAL
Qty: 0 | Refills: 0 | DISCHARGE

## 2020-01-16 RX ORDER — LEVOTHYROXINE SODIUM 125 MCG
1 TABLET ORAL
Qty: 0 | Refills: 0 | DISCHARGE

## 2020-01-16 RX ORDER — ASPIRIN/CALCIUM CARB/MAGNESIUM 324 MG
81 TABLET ORAL DAILY
Refills: 0 | Status: DISCONTINUED | OUTPATIENT
Start: 2020-01-16 | End: 2020-01-21

## 2020-01-16 RX ORDER — CHLORHEXIDINE GLUCONATE 213 G/1000ML
1 SOLUTION TOPICAL
Refills: 0 | Status: DISCONTINUED | OUTPATIENT
Start: 2020-01-16 | End: 2020-01-21

## 2020-01-16 RX ORDER — SIMVASTATIN 20 MG/1
40 TABLET, FILM COATED ORAL AT BEDTIME
Refills: 0 | Status: DISCONTINUED | OUTPATIENT
Start: 2020-01-16 | End: 2020-01-21

## 2020-01-16 RX ORDER — TIOTROPIUM BROMIDE 18 UG/1
1 CAPSULE ORAL; RESPIRATORY (INHALATION) ONCE
Refills: 0 | Status: COMPLETED | OUTPATIENT
Start: 2020-01-16 | End: 2020-01-17

## 2020-01-16 RX ORDER — FUROSEMIDE 40 MG
20 TABLET ORAL DAILY
Refills: 0 | Status: DISCONTINUED | OUTPATIENT
Start: 2020-01-16 | End: 2020-01-18

## 2020-01-16 RX ORDER — LEVOTHYROXINE SODIUM 125 MCG
25 TABLET ORAL DAILY
Refills: 0 | Status: DISCONTINUED | OUTPATIENT
Start: 2020-01-16 | End: 2020-01-21

## 2020-01-16 RX ORDER — FOLIC ACID 0.8 MG
1 TABLET ORAL DAILY
Refills: 0 | Status: DISCONTINUED | OUTPATIENT
Start: 2020-01-16 | End: 2020-01-21

## 2020-01-16 NOTE — H&P ADULT - NSICDXPASTSURGICALHX_GEN_ALL_CORE_FT
PAST SURGICAL HISTORY:  H/O bilateral hip replacements     History of bilateral knee replacement     History of surgery cardiac stents

## 2020-01-16 NOTE — ED PROVIDER NOTE - CLINICAL SUMMARY MEDICAL DECISION MAKING FREE TEXT BOX
Pt with resp distress, improved on bipap.  RSV positive,  Results reviewed and d/w patient and family.  Pt seen by intensivist and will admit to medical floor on droplet iso

## 2020-01-16 NOTE — ED ADULT NURSE NOTE - PSH
H/O bilateral hip replacements    History of bilateral knee replacement    History of surgery  cardiac stents

## 2020-01-16 NOTE — H&P ADULT - HISTORY OF PRESENT ILLNESS
85 yo M with PMHx of Unspecified CHF, Polio, Hypothyroidism, HTN presented with worsening shortness of breath associated with nonproductive cough since yesterday as well as congestion in the past week. Patient admits to sick contact exposure during [place]. Patient denies chest pain, palpitations, edema, fever, chills, nausea, vomiting. As per ED, patient was placed on BIPAP upon arrival secondary to increased work of breathing. 83 yo M with PMHx of Unspecified CHF, Polio, Hypothyroidism, HTN presented with worsening shortness of breath associated with nonproductive cough since yesterday as well as congestion in the past week. Patient admits to sick contact exposure (family members with ear infection, strep throat) during [place]. Patient denies chest pain, palpitations, edema, fever, chills, nausea, vomiting. As per ED, patient was placed on BIPAP upon arrival secondary to increased work of breathing.

## 2020-01-16 NOTE — ED ADULT TRIAGE NOTE - BSA (M2)
Number Of Coats: 3 Consent: Prior to the procedure, written consent was obtained and risks were reviewed, including but not limited to: redness, peeling, blistering, pigmentary change, scarring, infection, and pain. Post-Care Instructions: I reviewed with the patient in detail post-care instructions. Patient should avoid sun exposure and wear sun protection. Treatment Number: 0 Prep: The treated area was degreased with pre-peel cleanser, and vaseline was applied for protection of mucous membranes. Detail Level: Zone Post Peel Care: After the procedure, the treatment area was washed with soap and water, and a post-peel cream was applied. Sun protection and post-care instructions were reviewed with the patient.  Chemical peel was done by [facility] [employer] Peel: 10% TCA 1.87

## 2020-01-16 NOTE — CONSULT NOTE ADULT - SUBJECTIVE AND OBJECTIVE BOX
Patient is a 84y old  Male who presents with a chief complaint of shortness of breath.    83 yo M with PMHx of Unspecified CHF, Polio, Hypothyroidism, HTN presented with worsening shortness of breath associated with nonproductive cough since yesterday as well as congestion in the past week. Patient admits to sick contact exposure (family members with ear infection, strep throat). Patient denies chest pain, palpitations, edema, fever, chills, nausea, vomiting.        REVIEW OF SYSTEMS  General: no pain   Skin/Breast: no rashes 	  Ophthalmologic: no blurry vision 	  ENMT:	no thrush  Respiratory and Thorax: + cough, + sob 	  Cardiovascular:	no chest pain   Gastrointestinal:	no diarrhea   Genitourinary:	no dysuria   Musculoskeletal:	 no pain   Neurological:	no dizziness     Allergies  OxyContin (Vomiting)    T(F): 100 (01-16-20 @ 18:55), Max: 100 (01-16-20 @ 18:55)  HR: 102 (01-16-20 @ 17:45)  BP: 135/70 (01-16-20 @ 17:45)  RR: 18 (01-16-20 @ 17:45)  SpO2: 92% (01-16-20 @ 17:45) (92% - 98%)      PHYSICAL EXAM:  GENERAL: NAD, well-groomed, well-developed  HEAD:  Atraumatic, Normocephalic  EYES: EOMI, PERRLA, conjunctiva and sclera clear  ENMT: No tonsillar erythema, exudates, or enlargement; Moist mucous membranes, Good dentition, No lesions  NECK: Supple, No JVD, Normal thyroid  NERVOUS SYSTEM:  Alert & Oriented X3, Good concentration; Motor Strength 5/5 B/L upper and lower extremities; DTRs 2+ intact and symmetric  CHEST/LUNG: rales at bases   HEART: Regular rate and rhythm; No murmurs, rubs, or gallops  ABDOMEN: Soft, Nontender, Nondistended; Bowel sounds present  EXTREMITIES:  2+ Peripheral Pulses, No clubbing, cyanosis, or edema  LYMPH: No lymphadenopathy noted  SKIN: No rashes or lesions    labs  01-16    141  |  103  |  27<H>  ----------------------------<  142<H>  4.2   |  22  |  1.8<H>    Ca    9.1      16 Jan 2020 17:41  Mg     2.2     01-16    TPro  7.7  /  Alb  4.4  /  TBili  0.5  /  DBili  x   /  AST  26  /  ALT  25  /  AlkPhos  102  01-16                          16.4   6.55  )-----------( 194      ( 16 Jan 2020 17:41 )             50.2         PT/INR - ( 16 Jan 2020 17:41 )   PT: 12.00 sec;   INR: 1.04 ratio         PTT - ( 16 Jan 2020 17:41 )  PTT:32.7 sec      radiology    acetaminophen   Tablet .. 650 milliGRAM(s) Oral every 6 hours PRN  albuterol/ipratropium for Nebulization 3 milliLiter(s) Nebulizer every 6 hours  aspirin  chewable 81 milliGRAM(s) Oral daily  chlorhexidine 4% Liquid 1 Application(s) Topical <User Schedule>  folic acid 1 milliGRAM(s) Oral daily  furosemide    Tablet 20 milliGRAM(s) Oral daily  levothyroxine 25 MICROGram(s) Oral daily  simvastatin 40 milliGRAM(s) Oral at bedtime  tiotropium 18 MICROgram(s) Capsule 1 Capsule(s) Inhalation Once    PMhx: HTN, HLD   Social hx: non smoker   Family hx: no hx of cancer

## 2020-01-16 NOTE — GOALS OF CARE CONVERSATION - ADVANCED CARE PLANNING - CONVERSATION DETAILS
Goals of care discussed in light of patient's presenting symptoms and comorbidities, patient wishes to be full code.

## 2020-01-16 NOTE — ED ADULT TRIAGE NOTE - HEIGHT IN CM
"Gorge was seen today for hospital f/u, Cards referral and breast mass.    He was recently admitted to Cardiology for dizziness in the setting of low heart rate (40s).  His amiodorone was held, which he takes for chronic a fib.  Today, he says he feels \"better\".  He denies any chest pain or heart palpitations; no further dizzy spells.  However, his exertional dyspnea continues to worsen slightly and that is why he wanted the Cardiology referral.  He has known aortic stenosis and at one point was considering TAVR. He also feels he may benefit from PT for deconditioning.    Meanwhile he noted a lump or mass or swelling under the left breast.  No tenderness noted; no nipple or skin changes.  He denies any new medications and he is not taking spironolactone.    On exam  B/P: 97/59, P: 59  Cor irregular, with a loud late peaking systolic murmur  Lungs CTA  Ext trace edema  Left breast is slightly larger than the right but no palpable dominant mass    A/P    Chronic atrial fibrillation (H), Aortic Stenosis  -     CARDIOLOGY EVAL ADULT REFERRAL    Generalized muscle weakness  -     PHYSICAL THERAPY REFERRAL    Other abnormal and inconclusive findings on diagnostic imaging of breast   -     US Breast Left Complete 4 Quadrants; Future    Total time spent 25 minutes.  More than 50% of the time spent with Mr. Corona on counseling / coordinating his care    RTC 3 months or sooner karen Hobbs MD    " 157.48

## 2020-01-16 NOTE — H&P ADULT - NSHPPHYSICALEXAM_GEN_ALL_CORE
CONSTITUTIONAL: NAD  ENMT: EOMI, PERRLA, No tonsillar erythema, exudates, or enlargement, neck supple, No JVD  PSYCH: Alert & Oriented X3   RESPIRATORY: Clear to percussion bilaterally; No rales, rhonchi, wheezing, or rubs  CARDIOVASCULAR: Regular rate and rhythm; No murmurs, rubs, or gallops, negative edema  GASTROINTESTINAL: Soft, Nontender, Nondistended; Bowel sounds present  EXTREMITIES:  2+ Peripheral Pulses, No clubbing, cyanosis  SKIN: No rashes or lesions

## 2020-01-16 NOTE — ED PROVIDER NOTE - ATTENDING CONTRIBUTION TO CARE
83 yo M PMHx CHF, hypothyroid presents with family with c/o SOB that started today.  Pt with cough since yesterday, has had congestion all week.  no fevers, + sick exposures during the holidays.  On exam pts Tachypneic, AAO x 3, + increased work of breathing with accessory muscle use, + wwqheezing, Op clear, no edema, abd soft nt nd

## 2020-01-16 NOTE — H&P ADULT - NSHPLABSRESULTS_GEN_ALL_CORE
16.4   6.55  )-----------( 194      ( 16 Jan 2020 17:41 )             50.2       01-16    141  |  103  |  27<H>  ----------------------------<  142<H>  4.2   |  22  |  1.8<H>    Ca    9.1      16 Jan 2020 17:41  Mg     2.2     01-16    TPro  7.7  /  Alb  4.4  /  TBili  0.5  /  DBili  x   /  AST  26  /  ALT  25  /  AlkPhos  102  01-16          PT/INR - ( 16 Jan 2020 17:41 )   PT: 12.00 sec;   INR: 1.04 ratio         PTT - ( 16 Jan 2020 17:41 )  PTT:32.7 sec      CARDIAC MARKERS ( 16 Jan 2020 17:41 )  x     / <0.01 ng/mL / x     / x     / x

## 2020-01-16 NOTE — H&P ADULT - ASSESSMENT
83 yo M with above PMHX presented with worsening shortness of breath and nonproductive cough for 2 days duration.     #RSV Positive: Continue supportive care, nebs PRN, incentive spirometry, monitor for fever, TMax 100F in ED, contact precautions    #HTN/Hypothyrodism/Unspecified CHF (no sign of overt volume overload, continue lasix, strict intakes and outputs, daily weight): continue home medications    Code Status: Full Code    Disposition: Home when medically stable. 85 yo M with above PMHX presented with worsening shortness of breath and nonproductive cough for 2 days duration.     #RSV Positive: Continue supportive care, nebs PRN, incentive spirometry, monitor for fever, TMax 100F in ED, contact precautions    #HTN/Hypothyrodism/Unspecified CHF (no sign of overt volume overload, continue lasix, strict intakes and outputs, daily weight): continue home medications    #CKD III, stable    Code Status: Full Code    Disposition: Home when medically stable.

## 2020-01-16 NOTE — CONSULT NOTE ADULT - ASSESSMENT
83 yo M with above PMHX presented with worsening shortness of breath and nonproductive cough for 2 days duration.     1. RSV Positive:   - can titarye off bipap  - nebs  - if wheezing develops can give steroids     2. HTN  - c/w BP meds  - 2 d echo     3. Hypothyrodism  - fu tsh     4. Unspecified CHF   - 2 d echo   - c/w lasix   - keep I=O's     5. DVT ppx     can be managed on AMF     #CKD III, stable    Code Status: Full Code

## 2020-01-16 NOTE — ED ADULT NURSE NOTE - NSIMPLEMENTINTERV_GEN_ALL_ED
Implemented All Universal Safety Interventions:  [place] to call system. Call bell, personal items and telephone within reach. Instruct patient to call for assistance. Room bathroom lighting operational. Non-slip footwear when patient is off stretcher. Physically safe environment: no spills, clutter or unnecessary equipment. Stretcher in lowest position, wheels locked, appropriate side rails in place.

## 2020-01-16 NOTE — ED PROVIDER NOTE - CRITICAL CARE PROVIDED
consultation with other physicians/direct patient care (not related to procedure)/consult w/ pt's family directly relating to pts condition/documentation/interpretation of diagnostic studies/additional history taking

## 2020-01-16 NOTE — ED PROVIDER NOTE - OBJECTIVE STATEMENT
83 y/o male with hx of CHF, polio, hypothyroid presents with increasing SOB since yesterday. patient has had cough , congestion over past week. patient received flu vaccine this season. patient denies any leg edema, palpitations or chest pain. patient worsenign symptoms at night. patient with orthopnea. patient denies any productive cough. patient with exertional dysp[carin.

## 2020-01-17 LAB
ANION GAP SERPL CALC-SCNC: 14 MMOL/L — SIGNIFICANT CHANGE UP (ref 7–14)
BUN SERPL-MCNC: 26 MG/DL — HIGH (ref 10–20)
CALCIUM SERPL-MCNC: 8.7 MG/DL — SIGNIFICANT CHANGE UP (ref 8.5–10.1)
CHLORIDE SERPL-SCNC: 104 MMOL/L — SIGNIFICANT CHANGE UP (ref 98–110)
CO2 SERPL-SCNC: 23 MMOL/L — SIGNIFICANT CHANGE UP (ref 17–32)
CREAT SERPL-MCNC: 1.8 MG/DL — HIGH (ref 0.7–1.5)
GLUCOSE SERPL-MCNC: 129 MG/DL — HIGH (ref 70–99)
HCT VFR BLD CALC: 47.7 % — SIGNIFICANT CHANGE UP (ref 42–52)
HGB BLD-MCNC: 15.4 G/DL — SIGNIFICANT CHANGE UP (ref 14–18)
LACTATE SERPL-SCNC: 2.5 MMOL/L — HIGH (ref 0.7–2)
MAGNESIUM SERPL-MCNC: 2.1 MG/DL — SIGNIFICANT CHANGE UP (ref 1.8–2.4)
MCHC RBC-ENTMCNC: 27 PG — SIGNIFICANT CHANGE UP (ref 27–31)
MCHC RBC-ENTMCNC: 32.3 G/DL — SIGNIFICANT CHANGE UP (ref 32–37)
MCV RBC AUTO: 83.7 FL — SIGNIFICANT CHANGE UP (ref 80–94)
NRBC # BLD: 0 /100 WBCS — SIGNIFICANT CHANGE UP (ref 0–0)
PHOSPHATE SERPL-MCNC: 3.7 MG/DL — SIGNIFICANT CHANGE UP (ref 2.1–4.9)
PLATELET # BLD AUTO: 155 K/UL — SIGNIFICANT CHANGE UP (ref 130–400)
POTASSIUM SERPL-MCNC: 4.8 MMOL/L — SIGNIFICANT CHANGE UP (ref 3.5–5)
POTASSIUM SERPL-SCNC: 4.8 MMOL/L — SIGNIFICANT CHANGE UP (ref 3.5–5)
RBC # BLD: 5.7 M/UL — SIGNIFICANT CHANGE UP (ref 4.7–6.1)
RBC # FLD: 15.4 % — HIGH (ref 11.5–14.5)
SODIUM SERPL-SCNC: 141 MMOL/L — SIGNIFICANT CHANGE UP (ref 135–146)
WBC # BLD: 5.35 K/UL — SIGNIFICANT CHANGE UP (ref 4.8–10.8)
WBC # FLD AUTO: 5.35 K/UL — SIGNIFICANT CHANGE UP (ref 4.8–10.8)

## 2020-01-17 PROCEDURE — 99233 SBSQ HOSP IP/OBS HIGH 50: CPT

## 2020-01-17 RX ORDER — HEPARIN SODIUM 5000 [USP'U]/ML
5000 INJECTION INTRAVENOUS; SUBCUTANEOUS EVERY 12 HOURS
Refills: 0 | Status: DISCONTINUED | OUTPATIENT
Start: 2020-01-17 | End: 2020-01-21

## 2020-01-17 RX ADMIN — Medication 3 MILLILITER(S): at 08:56

## 2020-01-17 RX ADMIN — TIOTROPIUM BROMIDE 1 CAPSULE(S): 18 CAPSULE ORAL; RESPIRATORY (INHALATION) at 10:04

## 2020-01-17 RX ADMIN — Medication 100 MILLIGRAM(S): at 21:28

## 2020-01-17 RX ADMIN — SIMVASTATIN 40 MILLIGRAM(S): 20 TABLET, FILM COATED ORAL at 21:28

## 2020-01-17 RX ADMIN — Medication 100 MILLIGRAM(S): at 10:04

## 2020-01-17 RX ADMIN — Medication 25 MICROGRAM(S): at 05:38

## 2020-01-17 RX ADMIN — Medication 100 MILLIGRAM(S): at 13:26

## 2020-01-17 RX ADMIN — Medication 1 MILLIGRAM(S): at 11:17

## 2020-01-17 RX ADMIN — Medication 81 MILLIGRAM(S): at 11:17

## 2020-01-17 RX ADMIN — Medication 3 MILLILITER(S): at 19:33

## 2020-01-17 RX ADMIN — Medication 20 MILLIGRAM(S): at 05:37

## 2020-01-17 RX ADMIN — Medication 3 MILLILITER(S): at 13:17

## 2020-01-17 NOTE — PROGRESS NOTE ADULT - SUBJECTIVE AND OBJECTIVE BOX
AVA FERNANDEZ  84y  Male    Patient is a 84y old Male who presents with a chief complaint of shortness of breath (16 Jan 2020 20:47)      INTERVAL HPI/OVERNIGHT EVENTS:  No interval events.  Patient has no new complaints.  Cough and Dyspnea stable. No fevers.      REVIEW OF SYSTEMS:  At least 10 systems were reviewed in ROS.  All systems reviewed are within normal limits except for that listed above.      VITALS:  T(F): 96.2 (01-17-20 @ 04:36), Max: 100 (01-16-20 @ 18:55)  HR: 89 (01-17-20 @ 04:36) (87 - 102)  BP: 158/88 (01-17-20 @ 04:36) (123/79 - 174/100)  RR: 16 (01-17-20 @ 04:36) (16 - 18)  SpO2: 96% (01-17-20 @ 08:33) (92% - 99%)      PHYSICAL EXAM:  GENERAL: NAD, well-developed  HEAD:  Atraumatic, Normocephalic  EYES: conjunctiva and sclera clear  ENMT: Moist mucous membranes  NECK: Supple, Normal thyroid  NERVOUS SYSTEM:  Alert & Oriented X 3, Good concentration.  CHEST/LUNG: Decreased AE bilaterally; No rales, rhonchi, + wheezing.  HEART: Regular rate and rhythm; No murmurs, rubs, or gallops  ABDOMEN: Soft, Nontender, Nondistended; Bowel sounds present  EXTREMITIES:  2+ Peripheral Pulses, No clubbing, cyanosis, or edema  LYMPH: No lymphadenopathy noted  SKIN: No rashes or lesions    Consultant(s) Notes Reviewed:  [x ] YES  [ ] NO  Care Discussed with Consultants/Other Providers [ x] YES  [ ] NO    LABS:                        15.4   5.35  )-----------( 155      ( 17 Jan 2020 05:55 )             47.7     01-17    141  |  104  |  26<H>  ----------------------------<  129<H>  4.8   |  23  |  1.8<H>    Ca    8.7      17 Jan 2020 05:55  Phos  3.7     01-17  Mg     2.1     01-17    TPro  7.7  /  Alb  4.4  /  TBili  0.5  /  DBili  x   /  AST  26  /  ALT  25  /  AlkPhos  102  01-16      FLU A B RSV Detection by PCR (01.16.20 @ 17:41)    Flu A Result: Negative    Flu B Result: Negative    RSV Result: Positive      MICROBIOLOGY:      RADIOLOGY & ADDITIONAL TESTS:  X-ray Chest 1 View-PORTABLE IMMEDIATE (01.16.20 @ 18:13)     No radiographic evidence of acute cardiopulmonary disease.      Imaging Personally Reviewed:  [x] YES  [ ] NO      MEDICATIONS  (STANDING):  albuterol/ipratropium for Nebulization 3 milliLiter(s) Nebulizer every 6 hours  aspirin  chewable 81 milliGRAM(s) Oral daily  benzonatate 100 milliGRAM(s) Oral three times a day  chlorhexidine 4% Liquid 1 Application(s) Topical <User Schedule>  folic acid 1 milliGRAM(s) Oral daily  furosemide    Tablet 20 milliGRAM(s) Oral daily  levothyroxine 25 MICROGram(s) Oral daily  simvastatin 40 milliGRAM(s) Oral at bedtime    MEDICATIONS  (PRN):  acetaminophen   Tablet .. 650 milliGRAM(s) Oral every 6 hours PRN Temp greater or equal to 38C (100.4F)  guaiFENesin   Syrup  (Sugar-Free) 200 milliGRAM(s) Oral every 6 hours PRN Cough        Home Medications:  aspirin 81 mg oral tablet, chewable: 1 tab(s) orally once a day (16 Jan 2020 21:52)  folic acid:  (16 Jan 2020 21:52)  furosemide 20 mg oral tablet: 1 tab(s) orally once a day (16 Jan 2020 21:52)  simvastatin 40 mg oral tablet: 1 tab(s) orally once a day (at bedtime) (16 Jan 2020 21:52)  Spiriva:  (16 Jan 2020 21:52)  Synthroid 25 mcg (0.025 mg) oral tablet: 1 tab(s) orally once a day (16 Jan 2020 21:52)        HEALTH ISSUES - PROBLEM Dx:  RSV infection  h/o Polio  HFpEF  Hypothyroid  History of surgery: cardiac stents  History of bilateral knee replacement  H/O bilateral hip replacements

## 2020-01-17 NOTE — PROGRESS NOTE ADULT - ASSESSMENT
83 yo M with PMHx of HFpEF, Polio, Hypothyroidism, HTN presented with worsening shortness of breath associated with nonproductive cough since yesterday as well as congestion in the past week. Patient admits to sick contact exposure (family members with ear infection, strep throat) during [place]. Patient denies chest pain, palpitations, edema, fever, chills, nausea, vomiting. As per ED, patient was placed on BiPAP upon arrival secondary to increased work of breathing.     Assessment & Plan:    1. Acute Respiratory failure due to RSV Infection:  Supportive care. Supplemental oxygen/BiPAP prn.  Duoneb prn.  Continue Isolation.      2. HTN:  Monitor BP on home medications.      3. Hypothyroidism:  Continue Synthroid.      4. HFpEF:  No acute Exacerbation.   Last Cardiac Test 4/2017. LVEF >55%.  Continue Lasix.      5. CKD 3:  Creatinine stable.   Monitor BMP. Out patient follow up.        Prophylaxis: Heparin  Code status: Full code    Progress Note Handoff:  Pending consults: None  Pending Tests: None  Family/Patient discussion: Plan of care discussed with Patient.  Disposition: Home when stable.      Attending: Dr. Heidy Singh. [employer] [phone].

## 2020-01-18 LAB
ALBUMIN SERPL ELPH-MCNC: 3.8 G/DL — SIGNIFICANT CHANGE UP (ref 3.5–5.2)
ALP SERPL-CCNC: 91 U/L — SIGNIFICANT CHANGE UP (ref 30–115)
ALT FLD-CCNC: 19 U/L — SIGNIFICANT CHANGE UP (ref 0–41)
ANION GAP SERPL CALC-SCNC: 14 MMOL/L — SIGNIFICANT CHANGE UP (ref 7–14)
AST SERPL-CCNC: 20 U/L — SIGNIFICANT CHANGE UP (ref 0–41)
BILIRUB SERPL-MCNC: 0.5 MG/DL — SIGNIFICANT CHANGE UP (ref 0.2–1.2)
BUN SERPL-MCNC: 27 MG/DL — HIGH (ref 10–20)
CALCIUM SERPL-MCNC: 8.6 MG/DL — SIGNIFICANT CHANGE UP (ref 8.5–10.1)
CHLORIDE SERPL-SCNC: 104 MMOL/L — SIGNIFICANT CHANGE UP (ref 98–110)
CO2 SERPL-SCNC: 23 MMOL/L — SIGNIFICANT CHANGE UP (ref 17–32)
CREAT SERPL-MCNC: 1.7 MG/DL — HIGH (ref 0.7–1.5)
GLUCOSE SERPL-MCNC: 120 MG/DL — HIGH (ref 70–99)
MAGNESIUM SERPL-MCNC: 2.2 MG/DL — SIGNIFICANT CHANGE UP (ref 1.8–2.4)
PHOSPHATE SERPL-MCNC: 3.9 MG/DL — SIGNIFICANT CHANGE UP (ref 2.1–4.9)
POTASSIUM SERPL-MCNC: 4.7 MMOL/L — SIGNIFICANT CHANGE UP (ref 3.5–5)
POTASSIUM SERPL-SCNC: 4.7 MMOL/L — SIGNIFICANT CHANGE UP (ref 3.5–5)
PROT SERPL-MCNC: 6.9 G/DL — SIGNIFICANT CHANGE UP (ref 6–8)
SODIUM SERPL-SCNC: 141 MMOL/L — SIGNIFICANT CHANGE UP (ref 135–146)

## 2020-01-18 PROCEDURE — 71046 X-RAY EXAM CHEST 2 VIEWS: CPT | Mod: 26

## 2020-01-18 PROCEDURE — 99233 SBSQ HOSP IP/OBS HIGH 50: CPT

## 2020-01-18 RX ORDER — GUAIFENESIN/DEXTROMETHORPHAN 600MG-30MG
10 TABLET, EXTENDED RELEASE 12 HR ORAL EVERY 6 HOURS
Refills: 0 | Status: DISCONTINUED | OUTPATIENT
Start: 2020-01-18 | End: 2020-01-21

## 2020-01-18 RX ORDER — GUAIFENESIN/DEXTROMETHORPHAN 600MG-30MG
20 TABLET, EXTENDED RELEASE 12 HR ORAL EVERY 6 HOURS
Refills: 0 | Status: DISCONTINUED | OUTPATIENT
Start: 2020-01-18 | End: 2020-01-18

## 2020-01-18 RX ORDER — FUROSEMIDE 40 MG
40 TABLET ORAL DAILY
Refills: 0 | Status: DISCONTINUED | OUTPATIENT
Start: 2020-01-19 | End: 2020-01-21

## 2020-01-18 RX ORDER — LANOLIN ALCOHOL/MO/W.PET/CERES
3 CREAM (GRAM) TOPICAL ONCE
Refills: 0 | Status: COMPLETED | OUTPATIENT
Start: 2020-01-18 | End: 2020-01-18

## 2020-01-18 RX ADMIN — Medication 3 MILLILITER(S): at 19:29

## 2020-01-18 RX ADMIN — SIMVASTATIN 40 MILLIGRAM(S): 20 TABLET, FILM COATED ORAL at 21:43

## 2020-01-18 RX ADMIN — Medication 100 MILLIGRAM(S): at 21:43

## 2020-01-18 RX ADMIN — Medication 20 MILLIGRAM(S): at 05:20

## 2020-01-18 RX ADMIN — HEPARIN SODIUM 5000 UNIT(S): 5000 INJECTION INTRAVENOUS; SUBCUTANEOUS at 17:09

## 2020-01-18 RX ADMIN — Medication 3 MILLILITER(S): at 13:08

## 2020-01-18 RX ADMIN — Medication 3 MILLILITER(S): at 07:32

## 2020-01-18 RX ADMIN — Medication 10 MILLILITER(S): at 09:35

## 2020-01-18 RX ADMIN — Medication 100 MILLIGRAM(S): at 14:53

## 2020-01-18 RX ADMIN — Medication 100 MILLIGRAM(S): at 06:42

## 2020-01-18 RX ADMIN — Medication 25 MICROGRAM(S): at 05:20

## 2020-01-18 RX ADMIN — Medication 1 MILLIGRAM(S): at 11:53

## 2020-01-18 RX ADMIN — Medication 3 MILLIGRAM(S): at 21:44

## 2020-01-18 RX ADMIN — Medication 81 MILLIGRAM(S): at 11:53

## 2020-01-18 NOTE — PROGRESS NOTE ADULT - ASSESSMENT
85 yo M with PMHx of HFpEF, Polio, Hypothyroidism, HTN presented with worsening shortness of breath associated with nonproductive cough since yesterday as well as congestion in the past week. Patient admits to sick contact exposure (family members with ear infection, strep throat) during [place]. Patient denies chest pain, palpitations, edema, fever, chills, nausea, vomiting. As per ED, patient was placed on BiPAP upon arrival secondary to increased work of breathing.     Assessment & Plan:    1. Acute Hypoxic Respiratory failure due to RSV Infection:  Supportive care. Supplemental oxygen/BiPAP prn.  Duoneb prn. Follow up repeat CXR.  Continue Isolation.      2. HTN:  Monitor BP on home medications.      3. Hypothyroidism:  Continue Synthroid.      4. HFpEF:  No acute Exacerbation.   Last Cardiac Test 4/2017. LVEF >55%.  Continue Lasix.      5. CKD 3:  Creatinine stable.   Monitor BMP. Out patient follow up.        Prophylaxis: Heparin  Code status: Full code    Progress Note Handoff:  Pending consults: None  Pending Tests: None  Family/Patient discussion: Plan of care discussed with Patient.  Disposition: Home when stable.      Attending: Dr. Heidy Singh. [employer] [address].

## 2020-01-18 NOTE — PROGRESS NOTE ADULT - SUBJECTIVE AND OBJECTIVE BOX
AVA FERNANDEZ  84y  Male    Patient is a 84y old Male who presents with a chief complaint of shortness of breath (16 Jan 2020 20:47)      INTERVAL HPI/OVERNIGHT EVENTS:  No interval events.  Patient has no new complaints.  Cough and Dyspnea improved. No fevers.  Still requiring 3L oxygen.      REVIEW OF SYSTEMS:  At least 10 systems were reviewed in ROS.  All systems reviewed are within normal limits except for that listed above.      VITALS:  T(C): 35.7 (18 Jan 2020 04:53), Max: 36.8 (17 Jan 2020 14:02)  T(F): 96.2 (18 Jan 2020 04:53), Max: 98.3 (17 Jan 2020 14:02)  HR: 81 (18 Jan 2020 04:53) (81 - 94)  BP: 151/88 (18 Jan 2020 04:53) (126/73 - 162/87)  BP(mean): --  RR: 16 (18 Jan 2020 04:53) (16 - 16)  SpO2: 92% (18 Jan 2020 08:34) (92% - 92%)      PHYSICAL EXAM:  GENERAL: NAD, well-developed  HEAD:  Atraumatic, Normocephalic  EYES: conjunctiva and sclera clear  ENMT: Moist mucous membranes  NECK: Supple, Normal thyroid  NERVOUS SYSTEM:  Alert & Oriented X 3, Good concentration.  CHEST/LUNG: Decreased AE bilaterally; No rales, rhonchi, + wheezing.  HEART: Regular rate and rhythm; No murmurs, rubs, or gallops  ABDOMEN: Soft, Nontender, Nondistended; Bowel sounds present  EXTREMITIES:  2+ Peripheral Pulses, No clubbing, cyanosis, or edema  LYMPH: No lymphadenopathy noted  SKIN: No rashes or lesions    Consultant(s) Notes Reviewed:  [x ] YES  [ ] NO  Care Discussed with Consultants/Other Providers [ x] YES  [ ] NO    LABS:                        15.4   5.35  )-----------( 155      ( 17 Jan 2020 05:55 )             47.7     01-18    141  |  104  |  27<H>  ----------------------------<  120<H>  4.7   |  23  |  1.7<H>    Ca    8.6      18 Jan 2020 07:10  Phos  3.9     01-18  Mg     2.2     01-18    TPro  6.9  /  Alb  3.8  /  TBili  0.5  /  DBili  x   /  AST  20  /  ALT  19  /  AlkPhos  91  01-18      FLU A B RSV Detection by PCR (01.16.20 @ 17:41)    Flu A Result: Negative    Flu B Result: Negative    RSV Result: Positive      MICROBIOLOGY:      RADIOLOGY & ADDITIONAL TESTS:  X-ray Chest 1 View-PORTABLE IMMEDIATE (01.16.20 @ 18:13)     No radiographic evidence of acute cardiopulmonary disease.        Imaging Personally Reviewed:  [x] YES  [ ] NO      MEDICATIONS  (STANDING):  albuterol/ipratropium for Nebulization 3 milliLiter(s) Nebulizer every 6 hours  aspirin  chewable 81 milliGRAM(s) Oral daily  benzonatate 100 milliGRAM(s) Oral three times a day  chlorhexidine 4% Liquid 1 Application(s) Topical <User Schedule>  folic acid 1 milliGRAM(s) Oral daily  heparin  Injectable 5000 Unit(s) SubCutaneous every 12 hours  levothyroxine 25 MICROGram(s) Oral daily  simvastatin 40 milliGRAM(s) Oral at bedtime      MEDICATIONS  (PRN):  acetaminophen   Tablet .. 650 milliGRAM(s) Oral every 6 hours PRN Temp greater or equal to 38C (100.4F)  guaifenesin/dextromethorphan  Syrup 10 milliLiter(s) Oral every 6 hours PRN Cough      Home Medications:  aspirin 81 mg oral tablet, chewable: 1 tab(s) orally once a day (16 Jan 2020 21:52)  folic acid:  (16 Jan 2020 21:52)  furosemide 20 mg oral tablet: 1 tab(s) orally once a day (16 Jan 2020 21:52)  simvastatin 40 mg oral tablet: 1 tab(s) orally once a day (at bedtime) (16 Jan 2020 21:52)  Spiriva:  (16 Jan 2020 21:52)  Synthroid 25 mcg (0.025 mg) oral tablet: 1 tab(s) orally once a day (16 Jan 2020 21:52)        HEALTH ISSUES - PROBLEM Dx:  RSV infection  h/o Polio  HFpEF  Hypothyroid  History of surgery: cardiac stents  History of bilateral knee replacement  H/O bilateral hip replacements

## 2020-01-19 LAB
APPEARANCE UR: ABNORMAL
BACTERIA # UR AUTO: ABNORMAL
BILIRUB UR-MCNC: NEGATIVE — SIGNIFICANT CHANGE UP
COD CRY URNS QL: NEGATIVE — SIGNIFICANT CHANGE UP
COLOR SPEC: YELLOW — SIGNIFICANT CHANGE UP
DIFF PNL FLD: ABNORMAL
EPI CELLS # UR: NEGATIVE — SIGNIFICANT CHANGE UP
GLUCOSE UR QL: NEGATIVE MG/DL — SIGNIFICANT CHANGE UP
GRAN CASTS # UR COMP ASSIST: NEGATIVE — SIGNIFICANT CHANGE UP
HYALINE CASTS # UR AUTO: NEGATIVE — SIGNIFICANT CHANGE UP
KETONES UR-MCNC: NEGATIVE — SIGNIFICANT CHANGE UP
LEUKOCYTE ESTERASE UR-ACNC: ABNORMAL
NITRITE UR-MCNC: POSITIVE
PH UR: 6 — SIGNIFICANT CHANGE UP (ref 5–8)
PROT UR-MCNC: 100 MG/DL
RBC CASTS # UR COMP ASSIST: ABNORMAL /HPF
SP GR SPEC: >=1.03 (ref 1.01–1.03)
TRI-PHOS CRY UR QL COMP ASSIST: NEGATIVE — SIGNIFICANT CHANGE UP
URATE CRY FLD QL MICRO: NEGATIVE — SIGNIFICANT CHANGE UP
UROBILINOGEN FLD QL: 0.2 MG/DL — SIGNIFICANT CHANGE UP (ref 0.2–0.2)
WBC UR QL: ABNORMAL /HPF

## 2020-01-19 PROCEDURE — 99233 SBSQ HOSP IP/OBS HIGH 50: CPT

## 2020-01-19 RX ORDER — CEFPODOXIME PROXETIL 100 MG
100 TABLET ORAL EVERY 12 HOURS
Refills: 0 | Status: DISCONTINUED | OUTPATIENT
Start: 2020-01-19 | End: 2020-01-21

## 2020-01-19 RX ADMIN — Medication 40 MILLIGRAM(S): at 05:18

## 2020-01-19 RX ADMIN — Medication 40 MILLIGRAM(S): at 21:19

## 2020-01-19 RX ADMIN — Medication 100 MILLIGRAM(S): at 18:28

## 2020-01-19 RX ADMIN — Medication 10 MILLILITER(S): at 08:30

## 2020-01-19 RX ADMIN — Medication 25 MICROGRAM(S): at 05:18

## 2020-01-19 RX ADMIN — Medication 100 MILLIGRAM(S): at 05:18

## 2020-01-19 RX ADMIN — Medication 1 MILLIGRAM(S): at 11:37

## 2020-01-19 RX ADMIN — Medication 3 MILLILITER(S): at 13:09

## 2020-01-19 RX ADMIN — Medication 3 MILLILITER(S): at 01:42

## 2020-01-19 RX ADMIN — Medication 3 MILLILITER(S): at 08:06

## 2020-01-19 RX ADMIN — Medication 81 MILLIGRAM(S): at 11:37

## 2020-01-19 RX ADMIN — Medication 100 MILLIGRAM(S): at 17:11

## 2020-01-19 RX ADMIN — Medication 3 MILLILITER(S): at 19:32

## 2020-01-19 RX ADMIN — SIMVASTATIN 40 MILLIGRAM(S): 20 TABLET, FILM COATED ORAL at 21:19

## 2020-01-19 RX ADMIN — Medication 40 MILLIGRAM(S): at 14:53

## 2020-01-19 RX ADMIN — Medication 40 MILLIGRAM(S): at 09:11

## 2020-01-19 RX ADMIN — Medication 100 MILLIGRAM(S): at 21:19

## 2020-01-19 NOTE — PROGRESS NOTE ADULT - SUBJECTIVE AND OBJECTIVE BOX
AVA FERNANDEZ  84y  Male    Patient is a 84y old Male who presents with a chief complaint of shortness of breath (16 Jan 2020 20:47)      INTERVAL HPI/OVERNIGHT EVENTS:  No interval events.  Patient has no new complaints.  Cough and Dyspnea still persistent improved. No fevers.  Still requiring 3L oxygen. +++ wheezing.      REVIEW OF SYSTEMS:  At least 10 systems were reviewed in ROS.  All systems reviewed are within normal limits except for that listed above.      VITALS:  T(C): 35.8 (19 Jan 2020 05:32), Max: 36.7 (18 Jan 2020 21:42)  T(F): 96.4 (19 Jan 2020 05:32), Max: 98.1 (18 Jan 2020 21:42)  HR: 77 (19 Jan 2020 05:32) (77 - 87)  BP: 127/79 (19 Jan 2020 05:32) (127/79 - 151/91)  BP(mean): --  RR: 18 (19 Jan 2020 05:32) (16 - 18)  SpO2: 94% (19 Jan 2020 07:48) (94% - 95%)      PHYSICAL EXAM:  GENERAL: NAD, well-developed  HEAD:  Atraumatic, Normocephalic  EYES: conjunctiva and sclera clear  ENMT: Moist mucous membranes  NECK: Supple, Normal thyroid  NERVOUS SYSTEM:  Alert & Oriented X 3, Good concentration.  CHEST/LUNG: Decreased AE bilaterally; No rales, rhonchi, +++ wheezing.  HEART: Regular rate and rhythm; No murmurs, rubs, or gallops  ABDOMEN: Soft, Nontender, Nondistended; Bowel sounds present  EXTREMITIES:  2+ Peripheral Pulses, No clubbing, cyanosis, or edema  LYMPH: No lymphadenopathy noted  SKIN: No rashes or lesions    Consultant(s) Notes Reviewed:  [x ] YES  [ ] NO  Care Discussed with Consultants/Other Providers [ x] YES  [ ] NO    LABS:                        15.4   5.35  )-----------( 155      ( 17 Jan 2020 05:55 )             47.7       01-18    141  |  104  |  27<H>  ----------------------------<  120<H>  4.7   |  23  |  1.7<H>    Ca    8.6      18 Jan 2020 07:10  Phos  3.9     01-18  Mg     2.2     01-18    TPro  6.9  /  Alb  3.8  /  TBili  0.5  /  DBili  x   /  AST  20  /  ALT  19  /  AlkPhos  91  01-18      FLU A B RSV Detection by PCR (01.16.20 @ 17:41)    Flu A Result: Negative    Flu B Result: Negative    RSV Result: Positive      MICROBIOLOGY: None      RADIOLOGY & ADDITIONAL TESTS:  X-ray Chest 1 View-PORTABLE IMMEDIATE (01.16.20 @ 18:13)     No radiographic evidence of acute cardiopulmonary disease.      Xray Chest 2 Views PA/Lat (01.18.20 @ 12:16)   No focal opacity.          Imaging Personally Reviewed:  [x] YES  [ ] NO      MEDICATIONS  (STANDING):  albuterol/ipratropium for Nebulization 3 milliLiter(s) Nebulizer every 6 hours  aspirin chewable 81 milliGRAM(s) Oral daily  benzonatate 100 milliGRAM(s) Oral three times a day  chlorhexidine 4% Liquid 1 Application(s) Topical <User Schedule>  folic acid 1 milliGRAM(s) Oral daily  furosemide    Tablet 40 milliGRAM(s) Oral daily  heparin  Injectable 5000 Unit(s) SubCutaneous every 12 hours  levothyroxine 25 MICROGram(s) Oral daily  methylPREDNISolone sodium succinate Injectable 40 milliGRAM(s) IV Push every 8 hours  simvastatin 40 milliGRAM(s) Oral at bedtime      MEDICATIONS  (PRN):  acetaminophen   Tablet .. 650 milliGRAM(s) Oral every 6 hours PRN Temp greater or equal to 38C (100.4F)  guaifenesin/dextromethorphan  Syrup 10 milliLiter(s) Oral every 6 hours PRN Cough        Home Medications:  aspirin 81 mg oral tablet, chewable: 1 tab(s) orally once a day (16 Jan 2020 21:52)  folic acid:  (16 Jan 2020 21:52)  furosemide 20 mg oral tablet: 1 tab(s) orally once a day (16 Jan 2020 21:52)  simvastatin 40 mg oral tablet: 1 tab(s) orally once a day (at bedtime) (16 Jan 2020 21:52)  Spiriva:  (16 Jan 2020 21:52)  Synthroid 25 mcg (0.025 mg) oral tablet: 1 tab(s) orally once a day (16 Jan 2020 21:52)        HEALTH ISSUES - PROBLEM Dx:  RSV infection  h/o Polio  HFpEF  Hypothyroid  History of surgery: cardiac stents  History of bilateral knee replacement  H/O bilateral hip replacements

## 2020-01-19 NOTE — PROGRESS NOTE ADULT - ASSESSMENT
85 yo M with PMHx of HFpEF, Polio, Hypothyroidism, HTN presented with worsening shortness of breath associated with nonproductive cough since yesterday as well as congestion in the past week. Patient admits to sick contact exposure (family members with ear infection, strep throat) during [place]. Patient denies chest pain, palpitations, edema, fever, chills, nausea, vomiting. As per ED, patient was placed on BiPAP upon arrival secondary to increased work of breathing.     Assessment & Plan:    1. Acute Hypoxic Respiratory failure due to RSV Infection:  Supportive care. Supplemental oxygen/BiPAP prn.  Started on Solumedrol.   Duoneb prn. Repeat CXR: No acute abnormalities.  Continue Isolation.        2. HTN:  Monitor BP on home medications.      3. Hypothyroidism:  Continue Synthroid.      4. HFpEF:  No acute Exacerbation.   Last ECHO: 4/2017. LVEF >55%.  Continue Lasix.      5. CKD 3:  Creatinine stable.   Monitor BMP. Out patient follow up.        Prophylaxis: Heparin  Code status: Full code    Progress Note Handoff:  Pending consults: None  Pending Tests: None  Family/Patient discussion: Plan of care discussed with Patient.  Disposition: Home when stable.      Attending: Dr. Heidy Singh. [employer] [address].

## 2020-01-20 PROCEDURE — 99233 SBSQ HOSP IP/OBS HIGH 50: CPT

## 2020-01-20 RX ADMIN — Medication 40 MILLIGRAM(S): at 21:17

## 2020-01-20 RX ADMIN — Medication 40 MILLIGRAM(S): at 05:56

## 2020-01-20 RX ADMIN — Medication 81 MILLIGRAM(S): at 10:00

## 2020-01-20 RX ADMIN — Medication 25 MICROGRAM(S): at 05:57

## 2020-01-20 RX ADMIN — Medication 100 MILLIGRAM(S): at 05:56

## 2020-01-20 RX ADMIN — Medication 3 MILLILITER(S): at 20:06

## 2020-01-20 RX ADMIN — Medication 100 MILLIGRAM(S): at 06:37

## 2020-01-20 RX ADMIN — Medication 1 MILLIGRAM(S): at 10:00

## 2020-01-20 RX ADMIN — Medication 3 MILLILITER(S): at 14:03

## 2020-01-20 RX ADMIN — Medication 3 MILLILITER(S): at 07:43

## 2020-01-20 RX ADMIN — Medication 100 MILLIGRAM(S): at 17:14

## 2020-01-20 RX ADMIN — Medication 40 MILLIGRAM(S): at 13:32

## 2020-01-20 RX ADMIN — Medication 40 MILLIGRAM(S): at 05:57

## 2020-01-20 RX ADMIN — Medication 3 MILLILITER(S): at 19:32

## 2020-01-20 RX ADMIN — SIMVASTATIN 40 MILLIGRAM(S): 20 TABLET, FILM COATED ORAL at 21:17

## 2020-01-20 RX ADMIN — Medication 100 MILLIGRAM(S): at 22:33

## 2020-01-20 NOTE — PROGRESS NOTE ADULT - SUBJECTIVE AND OBJECTIVE BOX
AVA FERNANDEZ  84y  Male    Patient is a 84y old Male who presents with a chief complaint of shortness of breath (16 Jan 2020 20:47)      INTERVAL HPI/OVERNIGHT EVENTS:  No interval events.  Patient has no new complaints.  Cough and Dyspnea still persistent improved. No fevers.  Still requiring 3L oxygen. ++wheezing.      REVIEW OF SYSTEMS:  At least 10 systems were reviewed in ROS.  All systems reviewed are within normal limits except for that listed above.      VITALS:  T(C): 36.1 (20 Jan 2020 05:19), Max: 36.6 (19 Jan 2020 21:26)  T(F): 97 (20 Jan 2020 05:19), Max: 97.9 (19 Jan 2020 21:26)  HR: 95 (20 Jan 2020 05:19) (77 - 102)  BP: 147/89 (20 Jan 2020 05:19) (118/73 - 147/89)  BP(mean): --  RR: 18 (20 Jan 2020 05:19) (18 - 18)  SpO2: 95% (20 Jan 2020 00:31) (94% - 95%)      PHYSICAL EXAM:  GENERAL: NAD, well-developed  HEAD:  Atraumatic, Normocephalic  EYES: conjunctiva and sclera clear  ENMT: Moist mucous membranes  NECK: Supple, Normal thyroid  NERVOUS SYSTEM:  Alert & Oriented X 3, Good concentration.  CHEST/LUNG: Decreased AE bilaterally; No rales, rhonchi, ++wheezing.  HEART: Regular rate and rhythm; No murmurs, rubs, or gallops  ABDOMEN: Soft, Nontender, Nondistended; Bowel sounds present  EXTREMITIES:  2+ Peripheral Pulses, No clubbing, cyanosis, or edema  LYMPH: No lymphadenopathy noted  SKIN: No rashes or lesions    Consultant(s) Notes Reviewed:  [x ] YES  [ ] NO  Care Discussed with Consultants/Other Providers [ x] YES  [ ] NO    LABS:                        15.4   5.35  )-----------( 155      ( 17 Jan 2020 05:55 )             47.7       01-18    141  |  104  |  27<H>  ----------------------------<  120<H>  4.7   |  23  |  1.7<H>    Ca    8.6      18 Jan 2020 07:10  Phos  3.9     01-18  Mg     2.2     01-18    TPro  6.9  /  Alb  3.8  /  TBili  0.5  /  DBili  x   /  AST  20  /  ALT  19  /  AlkPhos  91  01-18      FLU A B RSV Detection by PCR (01.16.20 @ 17:41)    Flu A Result: Negative    Flu B Result: Negative    RSV Result: Positive      MICROBIOLOGY: None      RADIOLOGY & ADDITIONAL TESTS:  X-ray Chest 1 View-PORTABLE IMMEDIATE (01.16.20 @ 18:13)     No radiographic evidence of acute cardiopulmonary disease.      X-ray Chest 2 Views PA/Lat (01.18.20 @ 12:16)   No focal opacity.          Imaging Personally Reviewed:  [x] YES  [ ] NO      MEDICATIONS  (STANDING):  albuterol/ipratropium for Nebulization 3 milliLiter(s) Nebulizer every 6 hours  aspirin  chewable 81 milliGRAM(s) Oral daily  benzonatate 100 milliGRAM(s) Oral three times a day  cefpodoxime 100 milliGRAM(s) Oral every 12 hours  chlorhexidine 4% Liquid 1 Application(s) Topical <User Schedule>  folic acid 1 milliGRAM(s) Oral daily  furosemide    Tablet 40 milliGRAM(s) Oral daily  heparin  Injectable 5000 Unit(s) SubCutaneous every 12 hours  levothyroxine 25 MICROGram(s) Oral daily  methylPREDNISolone sodium succinate Injectable 40 milliGRAM(s) IV Push every 8 hours  simvastatin 40 milliGRAM(s) Oral at bedtime      MEDICATIONS  (PRN):  acetaminophen   Tablet .. 650 milliGRAM(s) Oral every 6 hours PRN Temp greater or equal to 38C (100.4F)  guaifenesin/dextromethorphan  Syrup 10 milliLiter(s) Oral every 6 hours PRN Cough          Home Medications:  aspirin 81 mg oral tablet, chewable: 1 tab(s) orally once a day (16 Jan 2020 21:52)  folic acid:  (16 Jan 2020 21:52)  furosemide 20 mg oral tablet: 1 tab(s) orally once a day (16 Jan 2020 21:52)  simvastatin 40 mg oral tablet: 1 tab(s) orally once a day (at bedtime) (16 Jan 2020 21:52)  Spiriva:  (16 Jan 2020 21:52)  Synthroid 25 mcg (0.025 mg) oral tablet: 1 tab(s) orally once a day (16 Jan 2020 21:52)        HEALTH ISSUES - PROBLEM Dx:  RSV infection  h/o Polio  HFpEF  Hypothyroid  History of surgery: cardiac stents  History of bilateral knee replacement  H/O bilateral hip replacements

## 2020-01-20 NOTE — PROGRESS NOTE ADULT - ASSESSMENT
83 yo M with PMHx of HFpEF, Polio, Hypothyroidism, HTN presented with worsening shortness of breath associated with nonproductive cough since yesterday as well as congestion in the past week. Patient admits to sick contact exposure (family members with ear infection, strep throat) during [place]. Patient denies chest pain, palpitations, edema, fever, chills, nausea, vomiting. As per ED, patient was placed on BiPAP upon arrival secondary to increased work of breathing.     Assessment & Plan:    1. Acute Hypoxic Respiratory failure due to RSV Infection:  Supportive care. Supplemental oxygen/BiPAP prn.  Continue Steroids. Taper when stable.  Duoneb prn. Repeat CXR: No acute abnormalities.  Continue Isolation.        2. HTN:  Monitor BP on home medications.      3. Hypothyroidism:  Continue Synthroid.      4. HFpEF:  No acute Exacerbation.   Last ECHO: 4/2017. LVEF >55%.  Continue Lasix.      5. CKD 3:  Creatinine stable.   Monitor BMP. Out patient follow up.        Prophylaxis: Heparin  Code status: Full code    Progress Note Handoff:  Pending consults: None  Pending Tests: None  Family/Patient discussion: Plan of care discussed with Patient.  Disposition: Home with home care when stable.      Attending: Dr. Heidy Singh. [employer] [address]. 83 yo M with PMHx of HFpEF, Polio, Hypothyroidism, HTN presented with worsening shortness of breath associated with nonproductive cough since yesterday as well as congestion in the past week. Patient admits to sick contact exposure (family members with ear infection, strep throat) during [place]. Patient denies chest pain, palpitations, edema, fever, chills, nausea, vomiting. As per ED, patient was placed on BiPAP upon arrival secondary to increased work of breathing.     Assessment & Plan:    1. Acute Hypoxic Respiratory failure due to RSV Infection:  Supportive care. Supplemental oxygen/BiPAP prn.  Continue Steroids. Taper when stable.  Duoneb prn. Repeat CXR: No acute abnormalities.  Continue Isolation.        2. UTI:  Continue Vantin pending Urine cultures.        3. Hypothyroidism:  Continue Synthroid.      4. HFpEF:  No acute Exacerbation.   Last ECHO: 4/2017. LVEF >55%.  Continue Lasix.      5. CKD 3:  Creatinine stable.   Monitor BMP. Out patient follow up.      6. HTN:  Monitor BP on home medications.        Prophylaxis: Heparin  Code status: Full code    Progress Note Handoff:  Pending consults: None  Pending Tests: None  Family/Patient discussion: Plan of care discussed with Patient.  Disposition: Home with home care when stable.      Attending: Dr. Heidy Singh. [employer] [address].

## 2020-01-21 ENCOUNTER — TRANSCRIPTION ENCOUNTER (OUTPATIENT)
Age: 85
End: 2020-01-21

## 2020-01-21 VITALS
TEMPERATURE: 97 F | RESPIRATION RATE: 16 BRPM | SYSTOLIC BLOOD PRESSURE: 131 MMHG | DIASTOLIC BLOOD PRESSURE: 74 MMHG | HEART RATE: 101 BPM

## 2020-01-21 PROCEDURE — 99239 HOSP IP/OBS DSCHRG MGMT >30: CPT

## 2020-01-21 RX ORDER — TIOTROPIUM BROMIDE 18 UG/1
0 CAPSULE ORAL; RESPIRATORY (INHALATION)
Qty: 0 | Refills: 0 | DISCHARGE

## 2020-01-21 RX ORDER — FOLIC ACID 0.8 MG
0 TABLET ORAL
Qty: 0 | Refills: 0 | DISCHARGE

## 2020-01-21 RX ORDER — ALBUTEROL 90 UG/1
2 AEROSOL, METERED ORAL
Qty: 1 | Refills: 0
Start: 2020-01-21

## 2020-01-21 RX ORDER — ACETAMINOPHEN 500 MG
2 TABLET ORAL
Qty: 0 | Refills: 0 | DISCHARGE
Start: 2020-01-21

## 2020-01-21 RX ORDER — PANTOPRAZOLE SODIUM 20 MG/1
1 TABLET, DELAYED RELEASE ORAL
Qty: 7 | Refills: 0
Start: 2020-01-21 | End: 2020-01-27

## 2020-01-21 RX ORDER — CEFPODOXIME PROXETIL 100 MG
1 TABLET ORAL
Qty: 8 | Refills: 0
Start: 2020-01-21 | End: 2020-01-24

## 2020-01-21 RX ADMIN — Medication 40 MILLIGRAM(S): at 05:32

## 2020-01-21 RX ADMIN — Medication 100 MILLIGRAM(S): at 05:32

## 2020-01-21 RX ADMIN — Medication 81 MILLIGRAM(S): at 11:08

## 2020-01-21 RX ADMIN — Medication 3 MILLILITER(S): at 08:00

## 2020-01-21 RX ADMIN — Medication 25 MICROGRAM(S): at 05:32

## 2020-01-21 RX ADMIN — Medication 1 MILLIGRAM(S): at 11:08

## 2020-01-21 RX ADMIN — Medication 100 MILLIGRAM(S): at 14:15

## 2020-01-21 NOTE — DISCHARGE NOTE PROVIDER - CARE PROVIDER_API CALL
PMD,   Please call for an appointment within 1 week after discharge.  Phone: (   )    -  Fax: (   )    -  Follow Up Time: 1 week

## 2020-01-21 NOTE — DISCHARGE NOTE PROVIDER - HOSPITAL COURSE
83 yo M with PMHx of HFpEF, Polio, Hypothyroidism, HTN presented with worsening shortness of breath associated with nonproductive cough since yesterday as well as congestion in the past week. Patient admits to sick contact exposure (family members with ear infection, strep throat) during [place]. Patient denies chest pain, palpitations, edema, fever, chills, nausea, vomiting. As per ED, patient was placed on BiPAP upon arrival secondary to increased work of breathing.         Assessment & Plan:        1. Acute Hypoxic Respiratory failure due to RSV Infection: Resolved.    Supportive care.     Complete course of steroids.    Duoneb prn. Repeat CXR: No acute abnormalities.    Stable for discharge home.                2. UTI:    Complete course of Vantin.    Will follow up Urine cultures.                3. Hypothyroidism:    Continued Synthroid.            4. HFpEF:    No acute Exacerbation.     Last ECHO: 4/2017. LVEF >55%.    Continued  Lasix.            5. CKD 3:    Creatinine stable.     Out patient follow up.            6. HTN:    BP stable on home medications.

## 2020-01-21 NOTE — PHYSICAL THERAPY INITIAL EVALUATION ADULT - GAIT DEVIATIONS NOTED, PT EVAL
decreased weight-shifting ability/increased time in double stance/decreased step length/decreased mac

## 2020-01-21 NOTE — DISCHARGE NOTE PROVIDER - PROVIDER TOKENS
FREE:[LAST:[PMD],PHONE:[(   )    -],FAX:[(   )    -],ADDRESS:[Please call for an appointment within 1 week after discharge.],FOLLOWUP:[1 week]]

## 2020-01-21 NOTE — ED PROVIDER NOTE - CHIEF COMPLAINT
Patient's  called to report patient used oral Rx as directed x 5 days  Now still has discharge, no irritation or odor  Questions what to do  The patient is a 83y Male complaining of elbow pain/injury.

## 2020-01-21 NOTE — PROGRESS NOTE ADULT - ASSESSMENT
83 yo M with PMHx of HFpEF, Polio, Hypothyroidism, HTN presented with worsening shortness of breath associated with nonproductive cough since yesterday as well as congestion in the past week. Patient admits to sick contact exposure (family members with ear infection, strep throat) during [place]. Patient denies chest pain, palpitations, edema, fever, chills, nausea, vomiting. As per ED, patient was placed on BiPAP upon arrival secondary to increased work of breathing.     Assessment & Plan:    1. Acute Hypoxic Respiratory failure due to RSV Infection: Resolved.  Supportive care.   Complete course of steroids.  Duoneb prn. Repeat CXR: No acute abnormalities.  Stable for discharge home.        2. UTI:  Complete course of Vantin.  Follow up Urine cultures.        3. Hypothyroidism:  Continue Synthroid.      4. HFpEF:  No acute Exacerbation.   Last ECHO: 4/2017. LVEF >55%.  Continue Lasix.      5. CKD 3:  Creatinine stable.   Monitor BMP. Out patient follow up.      6. HTN:  Monitor BP on home medications.        Prophylaxis: Heparin  Code status: Full code    Progress Note Handoff:  Pending consults: None  Pending Tests: None  Family/Patient discussion: Plan of care discussed with Patient.  Disposition: Home with home care.      Attending: Dr. Heidy Singh. [facility] [phone].

## 2020-01-21 NOTE — DISCHARGE NOTE PROVIDER - NSDCCPCAREPLAN_GEN_ALL_CORE_FT
PRINCIPAL DISCHARGE DIAGNOSIS  Diagnosis: RSV infection  Assessment and Plan of Treatment: Your cough and shortness of breath have improved. Complete Prednsione as prescribed. Protonix has been prescvribed to help with any tummy upset due to the steroids. Albuterol Inhaler should be used if wheezing or difficulty breatyhing occurs.      SECONDARY DISCHARGE DIAGNOSES  Diagnosis: Acute UTI  Assessment and Plan of Treatment: Take Vantin for the next 4 days.  You will be notified if Antibitics needs to be changed when urine culture results bceome available.

## 2020-01-21 NOTE — CONSULT NOTE ADULT - SUBJECTIVE AND OBJECTIVE BOX
HPI:  85 yo M with PMHx of Unspecified CHF, Polio, Hypothyroidism, HTN presented with worsening shortness of breath associated with nonproductive cough since yesterday as well as congestion in the past week. Patient admits to sick contact exposure (family members with ear infection, strep throat) during [place]. Patient denies chest pain, palpitations, edema, fever, chills, nausea, vomiting. As per ED, patient was placed on BIPAP upon arrival secondary to increased work of breathing. ptn  seen and  examed  at  bed side    PTN  REFERRED TO ACUTE  REHAB  FOR  EVAL AND  TX   PAST MEDICAL & SURGICAL HISTORY:  Polio  CHF (congestive heart failure)  Hypothyroid  History of surgery: cardiac stents  History of bilateral knee replacement  H/O bilateral hip replacements      Hospital Course:    TODAY'S SUBJECTIVE & REVIEW OF SYMPTOMS:     Constitutional WNL   Cardio WNL   Resp WNL   GI WNL  Heme WNL  Endo WNL  Skin WNL  MSK WNL  Neuro WNL  Cognitive WNL  Psych WNL      MEDICATIONS  (STANDING):  albuterol/ipratropium for Nebulization 3 milliLiter(s) Nebulizer every 6 hours  aspirin  chewable 81 milliGRAM(s) Oral daily  benzonatate 100 milliGRAM(s) Oral three times a day  cefpodoxime 100 milliGRAM(s) Oral every 12 hours  chlorhexidine 4% Liquid 1 Application(s) Topical <User Schedule>  folic acid 1 milliGRAM(s) Oral daily  furosemide    Tablet 40 milliGRAM(s) Oral daily  heparin  Injectable 5000 Unit(s) SubCutaneous every 12 hours  levothyroxine 25 MICROGram(s) Oral daily  methylPREDNISolone sodium succinate Injectable 40 milliGRAM(s) IV Push every 8 hours  simvastatin 40 milliGRAM(s) Oral at bedtime    MEDICATIONS  (PRN):  acetaminophen   Tablet .. 650 milliGRAM(s) Oral every 6 hours PRN Temp greater or equal to 38C (100.4F)  guaifenesin/dextromethorphan  Syrup 10 milliLiter(s) Oral every 6 hours PRN Cough      FAMILY HISTORY:      Allergies    OxyContin (Vomiting)    Intolerances        SOCIAL HISTORY:    [  ] Etoh  [  ] Smoking  [  ] Substance abuse     Home Environment:  [  ] Home Alone  [ x ] Lives with Family  [  ] Home Health Aid    Dwelling:  [  ] Apartment  [  x] Private House  [  ] Adult Home  [  ] Skilled Nursing Facility      [  ] Short Term  [  ] Long Term  [ x ] Stairs       Elevator [  ]    FUNCTIONAL STATUS PTA: (Check all that apply)  Ambulation: [ x  ]Independent    [  ] Dependent     [  ] Non-Ambulatory  Assistive Device: [x  ] SA Cane  [  ]  Q Cane  [ x ] Walker  [  ]  Wheelchair  ADL : [ x ] Independent  [  ]  Dependent       Vital Signs Last 24 Hrs  T(C): 35.8 (21 Jan 2020 04:25), Max: 35.8 (21 Jan 2020 04:25)  T(F): 96.5 (21 Jan 2020 04:25), Max: 96.5 (21 Jan 2020 04:25)  HR: 95 (21 Jan 2020 04:25) (95 - 101)  BP: 121/76 (21 Jan 2020 04:25) (121/76 - 132/89)  BP(mean): --  RR: 18 (21 Jan 2020 04:25) (16 - 18)  SpO2: 96% (21 Jan 2020 09:30) (92% - 96%)      PHYSICAL EXAM: Alert & Oriented X3  GENERAL: NAD, well-groomed, well-developed  HEAD:  Atraumatic, Normocephalic  EYES: EOMI, PERRLA, conjunctiva and sclera clear  NECK: Supple, No JVD, Normal thyroid  CHEST/LUNG: Clear to percussion bilaterally; No rales, rhonchi, wheezing, or rubs  HEART: Regular rate and rhythm; No murmurs, rubs, or gallops  ABDOMEN: Soft, Nontender, Nondistended; Bowel sounds present  EXTREMITIES:  2+ Peripheral Pulses, No clubbing, cyanosis, or edema    NERVOUS SYSTEM:  Cranial Nerves 2-12 intact [ x ] Abnormal  [  ]  ROM: WFL all extremities [ x ]  Abnormal [  ]  Motor Strength: WFL all extremities  [ x ]  Abnormal [  ]  Sensation: intact to light touch [  x] Abnormal [  ]  Reflexes: Symmetric [  x]  Abnormal [  ]    FUNCTIONAL STATUS:  Bed Mobility: Independent [  ]  Supervision [ x ]  Needs Assistance [  ]  N/A [  ]  Transfers: Independent [  ]  Supervision [x  ]  Needs Assistance [  ]  N/A [  ]   Ambulation: Independent [  ]  Supervision [ x ]  Needs Assistance [  ]  N/A [  ]  ADL: Independent [ x ] Requires Assistance [  ] N/A [  ]  SEE PT/OT IE NOTES    LABS:                RADIOLOGY & ADDITIONAL STUDIES:    Assesment:

## 2020-01-21 NOTE — CONSULT NOTE ADULT - ASSESSMENT
IMPRESSION: Rehab of 83 y/o m rehab  for  debility     PRECAUTIONS: [  ] Cardiac  [  ] Respiratory  [  ] Seizures [  ] Contact Isolation  [  ] Droplet Isolation  [ FALL ] Other    Weight Bearing Status:     RECOMMENDATION:    Out of Bed to Chair     DVT/Decubiti Prophylaxis    REHAB PLAN:     [ xx  ] Bedside P/T 3-5 times a week   [   ]   Bedside O/T  2-3 times a week             [   ] No Rehab Therapy Indicated                   [   ]  Speech Therapy   Conditioning/ROM                                    ADL  Bed Mobility                                               Conditioning/ROM  Transfers                                                     Bed Mobility  Sitting /Standing Balance                         Transfers                                        Gait Training                                               Sitting/Standing Balance  Stair Training [   ]Applicable                    Home equipment Eval                                                                        Splinting  [   ] Only      GOALS:   ADL   [x   ]   Independent                    Transfers  [ x  ] Independent                          Ambulation  [ x  ] Independent     [   x ] With device                            [   ]  CG                                                         [   ]  CG                                                                  [   ] CG                            [    ] Min A                                                   [   ] Min A                                                              [   ] Min  A          DISCHARGE PLAN:   [  ]  Good candidate for Intensive Rehabilitation/Hospital based-[facility]A [facility]                                             Will tolerate 3hrs Intensive Rehab Daily                                       [    ]  Short Term Rehab in Skilled Nursing Facility                                       [ xx   ]  Home with Outpatient or VN services                                         [    ]  Possible Candidate for Intensive Hospital based Rehab

## 2020-01-21 NOTE — DISCHARGE NOTE PROVIDER - NSDCMRMEDTOKEN_GEN_ALL_CORE_FT
acetaminophen 325 mg oral tablet: 2 tab(s) orally every 6 hours, As needed, Temp greater or equal to 38C (100.4F)  albuterol 90 mcg/inh inhalation powder: 2 puff(s) inhaled every 6 hours, As Needed   aspirin 81 mg oral tablet, chewable: 1 tab(s) orally once a day  benzonatate 100 mg oral capsule: 1 cap(s) orally 3 times a day, As Needed -for cough   cefpodoxime 100 mg oral tablet: 1 tab(s) orally every 12 hours  folic acid: 1 milligram(s) orally once a day  furosemide 20 mg oral tablet: 1 tab(s) orally once a day  predniSONE 20 mg oral tablet: 1 tab(s) orally once a day   Protonix 40 mg oral delayed release tablet: 1 tab(s) orally once a day   simvastatin 40 mg oral tablet: 1 tab(s) orally once a day (at bedtime)  Spiriva 18 mcg inhalation capsule: 1 cap(s) inhaled once a day  Synthroid 25 mcg (0.025 mg) oral tablet: 1 tab(s) orally once a day

## 2020-01-21 NOTE — DISCHARGE NOTE NURSING/CASE MANAGEMENT/SOCIAL WORK - PATIENT PORTAL LINK FT
You can access the FollowMyHealth Patient Portal offered by [facility] by registering at the following website: [email or web address]. By joining [employer]’s FollowMyHealth portal, you will also be able to view your health information using other applications (apps) compatible with our system.

## 2020-01-21 NOTE — PROGRESS NOTE ADULT - SUBJECTIVE AND OBJECTIVE BOX
AVA FERNANDEZ  84y  Male    Patient is a 84y old Male who presents with a chief complaint of shortness of breath (16 Jan 2020 20:47)      INTERVAL HPI/OVERNIGHT EVENTS:  No interval events.  Patient has no new complaints.  Wheezing, cough and Dyspnea still persistent improved. No fevers.  Now off supplemental oxygen.      REVIEW OF SYSTEMS:  At least 10 systems were reviewed in ROS.  All systems reviewed are within normal limits except for that listed above.      VITALS:  T(C): 35.8 (21 Jan 2020 04:25), Max: 35.8 (21 Jan 2020 04:25)  T(F): 96.5 (21 Jan 2020 04:25), Max: 96.5 (21 Jan 2020 04:25)  HR: 95 (21 Jan 2020 04:25) (95 - 101)  BP: 121/76 (21 Jan 2020 04:25) (121/76 - 132/89)  BP(mean): --  RR: 18 (21 Jan 2020 04:25) (16 - 18)  SpO2: 96% (21 Jan 2020 09:30) (92% - 96%)      PHYSICAL EXAM:  GENERAL: NAD, well-developed  HEAD:  Atraumatic, Normocephalic  EYES: conjunctiva and sclera clear  ENMT: Moist mucous membranes  NECK: Supple, Normal thyroid  NERVOUS SYSTEM:  Alert & Oriented X 3, Good concentration.  CHEST/LUNG: Decreased AE bilaterally; No rales, rhonchi, ++wheezing.  HEART: Regular rate and rhythm; No murmurs, rubs, or gallops  ABDOMEN: Soft, Nontender, Nondistended; Bowel sounds present  EXTREMITIES:  2+ Peripheral Pulses, No clubbing, cyanosis, or edema  LYMPH: No lymphadenopathy noted  SKIN: No rashes or lesions    Consultant(s) Notes Reviewed:  [x ] YES  [ ] NO  Care Discussed with Consultants/Other Providers [ x] YES  [ ] NO      LABS:                        15.4   5.35  )-----------( 155      ( 17 Jan 2020 05:55 )             47.7       01-18    141  |  104  |  27<H>  ----------------------------<  120<H>  4.7   |  23  |  1.7<H>    Ca    8.6      18 Jan 2020 07:10  Phos  3.9     01-18  Mg     2.2     01-18    TPro  6.9  /  Alb  3.8  /  TBili  0.5  /  DBili  x   /  AST  20  /  ALT  19  /  AlkPhos  91  01-18      FLU A B RSV Detection by PCR (01.16.20 @ 17:41)    Flu A Result: Negative    Flu B Result: Negative    RSV Result: Positive      MICROBIOLOGY: None  Culture - Urine (01.19.20 @ 08:40)    Specimen Source: .Urine Clean Catch (Midstream)    Culture Results:   >100,000 CFU/ml Gram Negative Rods      RADIOLOGY & ADDITIONAL TESTS:  X-ray Chest 1 View-PORTABLE IMMEDIATE (01.16.20 @ 18:13)     No radiographic evidence of acute cardiopulmonary disease.      X-ray Chest 2 Views PA/Lat (01.18.20 @ 12:16)   No focal opacity.          Imaging Personally Reviewed:  [x] YES  [ ] NO      MEDICATIONS  (STANDING):  albuterol/ipratropium for Nebulization 3 milliLiter(s) Nebulizer every 6 hours  aspirin  chewable 81 milliGRAM(s) Oral daily  benzonatate 100 milliGRAM(s) Oral three times a day  cefpodoxime 100 milliGRAM(s) Oral every 12 hours  chlorhexidine 4% Liquid 1 Application(s) Topical <User Schedule>  folic acid 1 milliGRAM(s) Oral daily  furosemide    Tablet 40 milliGRAM(s) Oral daily  heparin  Injectable 5000 Unit(s) SubCutaneous every 12 hours  levothyroxine 25 MICROGram(s) Oral daily  methylPREDNISolone sodium succinate Injectable 40 milliGRAM(s) IV Push every 8 hours  simvastatin 40 milliGRAM(s) Oral at bedtime      MEDICATIONS  (PRN):  acetaminophen   Tablet .. 650 milliGRAM(s) Oral every 6 hours PRN Temp greater or equal to 38C (100.4F)  guaifenesin/dextromethorphan  Syrup 10 milliLiter(s) Oral every 6 hours PRN Cough        Home Medications:  aspirin 81 mg oral tablet, chewable: 1 tab(s) orally once a day (16 Jan 2020 21:52)  folic acid:  (16 Jan 2020 21:52)  furosemide 20 mg oral tablet: 1 tab(s) orally once a day (16 Jan 2020 21:52)  simvastatin 40 mg oral tablet: 1 tab(s) orally once a day (at bedtime) (16 Jan 2020 21:52)  Spiriva:  (16 Jan 2020 21:52)  Synthroid 25 mcg (0.025 mg) oral tablet: 1 tab(s) orally once a day (16 Jan 2020 21:52)        HEALTH ISSUES - PROBLEM Dx:  RSV infection  h/o Polio  HFpEF  Hypothyroid  History of surgery: cardiac stents  History of bilateral knee replacement  H/O bilateral hip replacements

## 2020-01-21 NOTE — PHYSICAL THERAPY INITIAL EVALUATION ADULT - GENERAL OBSERVATIONS, REHAB EVAL
11:05 - 11:28. Chart reviewed. Patient available to be seen for physical therapy, confirmed with nurse. Patient encountered already out of bed in chair. Denies pain at rest, agreeable for PT evaluation.

## 2020-01-24 DIAGNOSIS — I50.30 UNSPECIFIED DIASTOLIC (CONGESTIVE) HEART FAILURE: ICD-10-CM

## 2020-01-24 DIAGNOSIS — J96.00 ACUTE RESPIRATORY FAILURE, UNSPECIFIED WHETHER WITH HYPOXIA OR HYPERCAPNIA: ICD-10-CM

## 2020-01-24 DIAGNOSIS — E03.9 HYPOTHYROIDISM, UNSPECIFIED: ICD-10-CM

## 2020-01-24 DIAGNOSIS — N39.0 URINARY TRACT INFECTION, SITE NOT SPECIFIED: ICD-10-CM

## 2020-01-24 DIAGNOSIS — R06.02 SHORTNESS OF BREATH: ICD-10-CM

## 2020-01-24 DIAGNOSIS — I13.0 HYPERTENSIVE HEART AND CHRONIC KIDNEY DISEASE WITH HEART FAILURE AND STAGE 1 THROUGH STAGE 4 CHRONIC KIDNEY DISEASE, OR UNSPECIFIED CHRONIC KIDNEY DISEASE: ICD-10-CM

## 2020-01-24 DIAGNOSIS — N18.3 CHRONIC KIDNEY DISEASE, STAGE 3 (MODERATE): ICD-10-CM

## 2020-01-24 DIAGNOSIS — B97.4 RESPIRATORY SYNCYTIAL VIRUS AS THE CAUSE OF DISEASES CLASSIFIED ELSEWHERE: ICD-10-CM

## 2020-12-02 ENCOUNTER — INPATIENT (INPATIENT)
Facility: HOSPITAL | Age: 85
LOS: 5 days | Discharge: HOME HEALTH | End: 2020-12-08
Attending: INTERNAL MEDICINE | Admitting: INTERNAL MEDICINE
Payer: MEDICARE

## 2020-12-02 VITALS
OXYGEN SATURATION: 96 % | DIASTOLIC BLOOD PRESSURE: 93 MMHG | TEMPERATURE: 99 F | RESPIRATION RATE: 18 BRPM | SYSTOLIC BLOOD PRESSURE: 184 MMHG | HEART RATE: 89 BPM | HEIGHT: 62 IN

## 2020-12-02 DIAGNOSIS — Z98.890 OTHER SPECIFIED POSTPROCEDURAL STATES: Chronic | ICD-10-CM

## 2020-12-02 DIAGNOSIS — Z96.653 PRESENCE OF ARTIFICIAL KNEE JOINT, BILATERAL: Chronic | ICD-10-CM

## 2020-12-02 DIAGNOSIS — Z96.643 PRESENCE OF ARTIFICIAL HIP JOINT, BILATERAL: Chronic | ICD-10-CM

## 2020-12-02 PROBLEM — A80.9 ACUTE POLIOMYELITIS, UNSPECIFIED: Chronic | Status: ACTIVE | Noted: 2020-01-16

## 2020-12-02 LAB
ALBUMIN SERPL ELPH-MCNC: 3.6 G/DL — SIGNIFICANT CHANGE UP (ref 3.5–5.2)
ALBUMIN SERPL ELPH-MCNC: 4.2 G/DL — SIGNIFICANT CHANGE UP (ref 3.5–5.2)
ALP SERPL-CCNC: 90 U/L — SIGNIFICANT CHANGE UP (ref 30–115)
ALP SERPL-CCNC: 98 U/L — SIGNIFICANT CHANGE UP (ref 30–115)
ALT FLD-CCNC: 35 U/L — SIGNIFICANT CHANGE UP (ref 0–41)
ALT FLD-CCNC: 40 U/L — SIGNIFICANT CHANGE UP (ref 0–41)
ANION GAP SERPL CALC-SCNC: 11 MMOL/L — SIGNIFICANT CHANGE UP (ref 7–14)
ANION GAP SERPL CALC-SCNC: 15 MMOL/L — HIGH (ref 7–14)
APPEARANCE UR: CLEAR — SIGNIFICANT CHANGE UP
APTT BLD: 28.7 SEC — SIGNIFICANT CHANGE UP (ref 27–39.2)
AST SERPL-CCNC: 39 U/L — SIGNIFICANT CHANGE UP (ref 0–41)
AST SERPL-CCNC: 44 U/L — HIGH (ref 0–41)
BACTERIA # UR AUTO: NEGATIVE — SIGNIFICANT CHANGE UP
BASOPHILS # BLD AUTO: 0.01 K/UL — SIGNIFICANT CHANGE UP (ref 0–0.2)
BASOPHILS # BLD AUTO: 0.01 K/UL — SIGNIFICANT CHANGE UP (ref 0–0.2)
BASOPHILS NFR BLD AUTO: 0.2 % — SIGNIFICANT CHANGE UP (ref 0–1)
BASOPHILS NFR BLD AUTO: 0.2 % — SIGNIFICANT CHANGE UP (ref 0–1)
BILIRUB SERPL-MCNC: 0.3 MG/DL — SIGNIFICANT CHANGE UP (ref 0.2–1.2)
BILIRUB SERPL-MCNC: 1 MG/DL — SIGNIFICANT CHANGE UP (ref 0.2–1.2)
BILIRUB UR-MCNC: NEGATIVE — SIGNIFICANT CHANGE UP
BUN SERPL-MCNC: 25 MG/DL — HIGH (ref 10–20)
BUN SERPL-MCNC: 29 MG/DL — HIGH (ref 10–20)
CALCIUM SERPL-MCNC: 8.3 MG/DL — LOW (ref 8.5–10.1)
CALCIUM SERPL-MCNC: 8.3 MG/DL — LOW (ref 8.5–10.1)
CHLORIDE SERPL-SCNC: 102 MMOL/L — SIGNIFICANT CHANGE UP (ref 98–110)
CHLORIDE SERPL-SCNC: 103 MMOL/L — SIGNIFICANT CHANGE UP (ref 98–110)
CK MB CFR SERPL CALC: 3.9 NG/ML — SIGNIFICANT CHANGE UP (ref 0.6–6.3)
CK MB CFR SERPL CALC: 4.8 NG/ML — SIGNIFICANT CHANGE UP (ref 0.6–6.3)
CK SERPL-CCNC: 393 U/L — HIGH (ref 0–225)
CO2 SERPL-SCNC: 21 MMOL/L — SIGNIFICANT CHANGE UP (ref 17–32)
CO2 SERPL-SCNC: 22 MMOL/L — SIGNIFICANT CHANGE UP (ref 17–32)
COLOR SPEC: YELLOW — SIGNIFICANT CHANGE UP
CREAT ?TM UR-MCNC: 148 MG/DL — SIGNIFICANT CHANGE UP
CREAT SERPL-MCNC: 1.7 MG/DL — HIGH (ref 0.7–1.5)
CREAT SERPL-MCNC: 2.1 MG/DL — HIGH (ref 0.7–1.5)
D DIMER BLD IA.RAPID-MCNC: 370 NG/ML DDU — HIGH (ref 0–230)
DIFF PNL FLD: ABNORMAL
EOSINOPHIL # BLD AUTO: 0.01 K/UL — SIGNIFICANT CHANGE UP (ref 0–0.7)
EOSINOPHIL # BLD AUTO: 0.01 K/UL — SIGNIFICANT CHANGE UP (ref 0–0.7)
EOSINOPHIL NFR BLD AUTO: 0.2 % — SIGNIFICANT CHANGE UP (ref 0–8)
EOSINOPHIL NFR BLD AUTO: 0.2 % — SIGNIFICANT CHANGE UP (ref 0–8)
EPI CELLS # UR: 1 /HPF — SIGNIFICANT CHANGE UP (ref 0–5)
ETHANOL SERPL-MCNC: <10 MG/DL — SIGNIFICANT CHANGE UP
GLUCOSE SERPL-MCNC: 127 MG/DL — HIGH (ref 70–99)
GLUCOSE SERPL-MCNC: 146 MG/DL — HIGH (ref 70–99)
GLUCOSE UR QL: NEGATIVE — SIGNIFICANT CHANGE UP
HCT VFR BLD CALC: 49.5 % — SIGNIFICANT CHANGE UP (ref 42–52)
HCT VFR BLD CALC: 49.7 % — SIGNIFICANT CHANGE UP (ref 42–52)
HGB BLD-MCNC: 15.8 G/DL — SIGNIFICANT CHANGE UP (ref 14–18)
HGB BLD-MCNC: 15.9 G/DL — SIGNIFICANT CHANGE UP (ref 14–18)
HYALINE CASTS # UR AUTO: 6 /LPF — SIGNIFICANT CHANGE UP (ref 0–7)
IMM GRANULOCYTES NFR BLD AUTO: 0.2 % — SIGNIFICANT CHANGE UP (ref 0.1–0.3)
IMM GRANULOCYTES NFR BLD AUTO: 0.3 % — SIGNIFICANT CHANGE UP (ref 0.1–0.3)
INR BLD: 1.06 RATIO — SIGNIFICANT CHANGE UP (ref 0.65–1.3)
KETONES UR-MCNC: NEGATIVE — SIGNIFICANT CHANGE UP
LACTATE SERPL-SCNC: 1.8 MMOL/L — SIGNIFICANT CHANGE UP (ref 0.7–2)
LDH SERPL L TO P-CCNC: 346 U/L — HIGH (ref 50–242)
LEUKOCYTE ESTERASE UR-ACNC: NEGATIVE — SIGNIFICANT CHANGE UP
LIDOCAIN IGE QN: 40 U/L — SIGNIFICANT CHANGE UP (ref 7–60)
LYMPHOCYTES # BLD AUTO: 0.84 K/UL — LOW (ref 1.2–3.4)
LYMPHOCYTES # BLD AUTO: 0.87 K/UL — LOW (ref 1.2–3.4)
LYMPHOCYTES # BLD AUTO: 12.7 % — LOW (ref 20.5–51.1)
LYMPHOCYTES # BLD AUTO: 18.6 % — LOW (ref 20.5–51.1)
MAGNESIUM SERPL-MCNC: 2.1 MG/DL — SIGNIFICANT CHANGE UP (ref 1.8–2.4)
MCHC RBC-ENTMCNC: 26.2 PG — LOW (ref 27–31)
MCHC RBC-ENTMCNC: 26.4 PG — LOW (ref 27–31)
MCHC RBC-ENTMCNC: 31.9 G/DL — LOW (ref 32–37)
MCHC RBC-ENTMCNC: 32 G/DL — SIGNIFICANT CHANGE UP (ref 32–37)
MCV RBC AUTO: 82 FL — SIGNIFICANT CHANGE UP (ref 80–94)
MCV RBC AUTO: 82.6 FL — SIGNIFICANT CHANGE UP (ref 80–94)
MONOCYTES # BLD AUTO: 0.47 K/UL — SIGNIFICANT CHANGE UP (ref 0.1–0.6)
MONOCYTES # BLD AUTO: 0.69 K/UL — HIGH (ref 0.1–0.6)
MONOCYTES NFR BLD AUTO: 10 % — HIGH (ref 1.7–9.3)
MONOCYTES NFR BLD AUTO: 10.4 % — HIGH (ref 1.7–9.3)
NEUTROPHILS # BLD AUTO: 3.32 K/UL — SIGNIFICANT CHANGE UP (ref 1.4–6.5)
NEUTROPHILS # BLD AUTO: 5.04 K/UL — SIGNIFICANT CHANGE UP (ref 1.4–6.5)
NEUTROPHILS NFR BLD AUTO: 70.8 % — SIGNIFICANT CHANGE UP (ref 42.2–75.2)
NEUTROPHILS NFR BLD AUTO: 76.2 % — HIGH (ref 42.2–75.2)
NITRITE UR-MCNC: NEGATIVE — SIGNIFICANT CHANGE UP
NRBC # BLD: 0 /100 WBCS — SIGNIFICANT CHANGE UP (ref 0–0)
NRBC # BLD: 0 /100 WBCS — SIGNIFICANT CHANGE UP (ref 0–0)
NT-PROBNP SERPL-SCNC: 175 PG/ML — SIGNIFICANT CHANGE UP (ref 0–300)
OSMOLALITY UR: 689 MOS/KG — SIGNIFICANT CHANGE UP (ref 50–1200)
PH UR: 6 — SIGNIFICANT CHANGE UP (ref 5–8)
PLATELET # BLD AUTO: 131 K/UL — SIGNIFICANT CHANGE UP (ref 130–400)
PLATELET # BLD AUTO: 143 K/UL — SIGNIFICANT CHANGE UP (ref 130–400)
POTASSIUM SERPL-MCNC: 4.8 MMOL/L — SIGNIFICANT CHANGE UP (ref 3.5–5)
POTASSIUM SERPL-MCNC: 5.3 MMOL/L — HIGH (ref 3.5–5)
POTASSIUM SERPL-SCNC: 4.8 MMOL/L — SIGNIFICANT CHANGE UP (ref 3.5–5)
POTASSIUM SERPL-SCNC: 5.3 MMOL/L — HIGH (ref 3.5–5)
PROT ?TM UR-MCNC: 115 MG/DLG/24H — SIGNIFICANT CHANGE UP
PROT SERPL-MCNC: 6.8 G/DL — SIGNIFICANT CHANGE UP (ref 6–8)
PROT SERPL-MCNC: 7.3 G/DL — SIGNIFICANT CHANGE UP (ref 6–8)
PROT UR-MCNC: ABNORMAL
PROT/CREAT UR-RTO: 0.8 RATIO — HIGH (ref 0–0.2)
PROTHROM AB SERPL-ACNC: 12.2 SEC — SIGNIFICANT CHANGE UP (ref 9.95–12.87)
RBC # BLD: 6.02 M/UL — SIGNIFICANT CHANGE UP (ref 4.7–6.1)
RBC # BLD: 6.04 M/UL — SIGNIFICANT CHANGE UP (ref 4.7–6.1)
RBC # FLD: 14.3 % — SIGNIFICANT CHANGE UP (ref 11.5–14.5)
RBC # FLD: 14.4 % — SIGNIFICANT CHANGE UP (ref 11.5–14.5)
RBC CASTS # UR COMP ASSIST: 3 /HPF — SIGNIFICANT CHANGE UP (ref 0–4)
SARS-COV-2 RNA SPEC QL NAA+PROBE: DETECTED
SODIUM SERPL-SCNC: 136 MMOL/L — SIGNIFICANT CHANGE UP (ref 135–146)
SODIUM SERPL-SCNC: 138 MMOL/L — SIGNIFICANT CHANGE UP (ref 135–146)
SODIUM UR-SCNC: 111 MMOL/L — SIGNIFICANT CHANGE UP
SP GR SPEC: 1.03 — SIGNIFICANT CHANGE UP (ref 1.01–1.03)
TROPONIN T SERPL-MCNC: <0.01 NG/ML — SIGNIFICANT CHANGE UP
UROBILINOGEN FLD QL: SIGNIFICANT CHANGE UP
UUN UR-MCNC: 1081 MG/DL — SIGNIFICANT CHANGE UP
WBC # BLD: 4.69 K/UL — LOW (ref 4.8–10.8)
WBC # BLD: 6.61 K/UL — SIGNIFICANT CHANGE UP (ref 4.8–10.8)
WBC # FLD AUTO: 4.69 K/UL — LOW (ref 4.8–10.8)
WBC # FLD AUTO: 6.61 K/UL — SIGNIFICANT CHANGE UP (ref 4.8–10.8)
WBC UR QL: 3 /HPF — SIGNIFICANT CHANGE UP (ref 0–5)

## 2020-12-02 PROCEDURE — 99223 1ST HOSP IP/OBS HIGH 75: CPT

## 2020-12-02 PROCEDURE — 74176 CT ABD & PELVIS W/O CONTRAST: CPT | Mod: 26

## 2020-12-02 PROCEDURE — 76770 US EXAM ABDO BACK WALL COMP: CPT | Mod: 26

## 2020-12-02 PROCEDURE — 93010 ELECTROCARDIOGRAM REPORT: CPT

## 2020-12-02 PROCEDURE — 70450 CT HEAD/BRAIN W/O DYE: CPT | Mod: 26

## 2020-12-02 PROCEDURE — 71250 CT THORAX DX C-: CPT | Mod: 26

## 2020-12-02 PROCEDURE — 71045 X-RAY EXAM CHEST 1 VIEW: CPT | Mod: 26

## 2020-12-02 PROCEDURE — 93880 EXTRACRANIAL BILAT STUDY: CPT | Mod: 26

## 2020-12-02 PROCEDURE — 72170 X-RAY EXAM OF PELVIS: CPT | Mod: 26

## 2020-12-02 PROCEDURE — 72125 CT NECK SPINE W/O DYE: CPT | Mod: 26

## 2020-12-02 PROCEDURE — 99285 EMERGENCY DEPT VISIT HI MDM: CPT

## 2020-12-02 RX ORDER — PANTOPRAZOLE SODIUM 20 MG/1
40 TABLET, DELAYED RELEASE ORAL
Refills: 0 | Status: DISCONTINUED | OUTPATIENT
Start: 2020-12-02 | End: 2020-12-08

## 2020-12-02 RX ORDER — ASPIRIN/CALCIUM CARB/MAGNESIUM 324 MG
1 TABLET ORAL
Qty: 0 | Refills: 0 | DISCHARGE

## 2020-12-02 RX ORDER — TIOTROPIUM BROMIDE 18 UG/1
1 CAPSULE ORAL; RESPIRATORY (INHALATION)
Qty: 0 | Refills: 0 | DISCHARGE

## 2020-12-02 RX ORDER — FUROSEMIDE 40 MG
20 TABLET ORAL DAILY
Refills: 0 | Status: DISCONTINUED | OUTPATIENT
Start: 2020-12-02 | End: 2020-12-08

## 2020-12-02 RX ORDER — AMLODIPINE BESYLATE 2.5 MG/1
10 TABLET ORAL DAILY
Refills: 0 | Status: DISCONTINUED | OUTPATIENT
Start: 2020-12-02 | End: 2020-12-08

## 2020-12-02 RX ORDER — AMLODIPINE BESYLATE AND BENAZEPRIL HYDROCHLORIDE 10; 20 MG/1; MG/1
1 CAPSULE ORAL
Qty: 0 | Refills: 0 | DISCHARGE

## 2020-12-02 RX ORDER — FOLIC ACID 0.8 MG
1 TABLET ORAL
Qty: 0 | Refills: 0 | DISCHARGE

## 2020-12-02 RX ORDER — ACETAMINOPHEN 500 MG
975 TABLET ORAL ONCE
Refills: 0 | Status: COMPLETED | OUTPATIENT
Start: 2020-12-02 | End: 2020-12-02

## 2020-12-02 RX ORDER — FUROSEMIDE 40 MG
1 TABLET ORAL
Qty: 0 | Refills: 0 | DISCHARGE

## 2020-12-02 RX ORDER — SIMVASTATIN 20 MG/1
40 TABLET, FILM COATED ORAL AT BEDTIME
Refills: 0 | Status: DISCONTINUED | OUTPATIENT
Start: 2020-12-02 | End: 2020-12-08

## 2020-12-02 RX ORDER — CHLORHEXIDINE GLUCONATE 213 G/1000ML
1 SOLUTION TOPICAL
Refills: 0 | Status: DISCONTINUED | OUTPATIENT
Start: 2020-12-02 | End: 2020-12-08

## 2020-12-02 RX ORDER — SIMVASTATIN 20 MG/1
1 TABLET, FILM COATED ORAL
Qty: 0 | Refills: 0 | DISCHARGE

## 2020-12-02 RX ORDER — DEXAMETHASONE 0.5 MG/5ML
6 ELIXIR ORAL DAILY
Refills: 0 | Status: DISCONTINUED | OUTPATIENT
Start: 2020-12-02 | End: 2020-12-02

## 2020-12-02 RX ORDER — LEVOTHYROXINE SODIUM 125 MCG
25 TABLET ORAL DAILY
Refills: 0 | Status: DISCONTINUED | OUTPATIENT
Start: 2020-12-02 | End: 2020-12-08

## 2020-12-02 RX ORDER — FOLIC ACID 0.8 MG
1 TABLET ORAL DAILY
Refills: 0 | Status: DISCONTINUED | OUTPATIENT
Start: 2020-12-02 | End: 2020-12-08

## 2020-12-02 RX ORDER — ASPIRIN/CALCIUM CARB/MAGNESIUM 324 MG
81 TABLET ORAL DAILY
Refills: 0 | Status: DISCONTINUED | OUTPATIENT
Start: 2020-12-02 | End: 2020-12-08

## 2020-12-02 RX ORDER — SODIUM CHLORIDE 9 MG/ML
500 INJECTION, SOLUTION INTRAVENOUS ONCE
Refills: 0 | Status: COMPLETED | OUTPATIENT
Start: 2020-12-02 | End: 2020-12-02

## 2020-12-02 RX ORDER — HEPARIN SODIUM 5000 [USP'U]/ML
5000 INJECTION INTRAVENOUS; SUBCUTANEOUS EVERY 8 HOURS
Refills: 0 | Status: DISCONTINUED | OUTPATIENT
Start: 2020-12-02 | End: 2020-12-08

## 2020-12-02 RX ORDER — LEVOTHYROXINE SODIUM 125 MCG
1 TABLET ORAL
Qty: 0 | Refills: 0 | DISCHARGE

## 2020-12-02 RX ADMIN — SIMVASTATIN 40 MILLIGRAM(S): 20 TABLET, FILM COATED ORAL at 22:46

## 2020-12-02 RX ADMIN — Medication 20 MILLIGRAM(S): at 13:00

## 2020-12-02 RX ADMIN — Medication 81 MILLIGRAM(S): at 13:00

## 2020-12-02 RX ADMIN — AMLODIPINE BESYLATE 10 MILLIGRAM(S): 2.5 TABLET ORAL at 13:00

## 2020-12-02 RX ADMIN — Medication 1 MILLIGRAM(S): at 13:00

## 2020-12-02 RX ADMIN — SODIUM CHLORIDE 500 MILLILITER(S): 9 INJECTION, SOLUTION INTRAVENOUS at 02:53

## 2020-12-02 RX ADMIN — Medication 975 MILLIGRAM(S): at 00:54

## 2020-12-02 NOTE — CHART NOTE - NSCHARTNOTEFT_GEN_A_CORE
Patient seen this morning. No significant events overnight. he is in high spirits. he reports that he fell from his bed. He says that this has happended once before. Denies any h/o seizures though. There is a possibilty either patient is having nightmares or may be he is sleep-walking. Trauma w/u has been negative.    > CT cervical spine shows severe stenosis at multiple levels.  > also incidental finding of ascending aortic aneurysm 4.5 cm. will need op follow up with vascular  > pt had a fever of 100.6F overnight will follow up  > will DC Dexa as patient is stable. Not symptomatic from COVID.  > Mild LOLI , SCr 1.7 -> 2.1. Will repeat at 11 this morning and trend  > repeat orthostatics and perform EEG.

## 2020-12-02 NOTE — CONSULT NOTE ADULT - ASSESSMENT
86 yo M PMHx HTN, CAD s/p 2 stents, polio, hypothyroidism and tested COVID positive 4 days prior to presentation, presented after unwitnessed fall. Pt states that he went to sleep and then woke up because he fell on the floor, thinks he was having a nightmare. Pt was on the floor, unable to get up on his own and called his daughter to help. While on the floor he had a bowel movement (no incontinence). CTH was negative. Pt is AAOX4, no focal deficits. Noted LLE atrophy likely due to polio.    Recommendations:  - Routine EEG  - No need for AEDs at this time  - if EEG is negative, may d/c as per primary team 86 yo M PMHx HTN, CAD s/p 2 stents, polio, hypothyroidism and tested COVID positive 4 days prior to presentation, presented after unwitnessed fall. Pt states that he went to sleep and then woke up because he fell on the floor, thinks he was having a nightmare. Pt was on the floor, unable to get up on his own and called his daughter to help. While on the floor he had a bowel movement (no incontinence). CTH was negative. Pt is AAOX4, no focal deficits. Noted LLE atrophy likely due to polio.    Recommendations:  - Routine EEG  - No need for MRI  - No need for AEDs at this time  - if EEG is negative, may d/c as per primary team      ATTENDING ATTESTATION TO FOLLOW 86 yo M PMHx HTN, CAD s/p 2 stents, polio, hypothyroidism and tested COVID positive 4 days prior to presentation, presented after unwitnessed fall. Pt states that he went to sleep and then woke up because he fell on the floor, thinks he was having a nightmare. 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  Doubt seizure activity.     Recommendations:  - f/u Routine EEG results  - No need for AEDs at this time  - if EEG is negative, no further inpt neurologic workup at this time

## 2020-12-02 NOTE — ED ADULT NURSE NOTE - NSIMPLEMENTINTERV_GEN_ALL_ED
Implemented All Fall with Harm Risk Interventions:  [place] to call system. Call bell, personal items and telephone within reach. Instruct patient to call for assistance. Room bathroom lighting operational. Non-slip footwear when patient is off stretcher. Physically safe environment: no spills, clutter or unnecessary equipment. Stretcher in lowest position, wheels locked, appropriate side rails in place. Provide visual cue, wrist band, yellow gown, etc. Monitor gait and stability. Monitor for mental status changes and reorient to person, place, and time. Review medications for side effects contributing to fall risk. Reinforce activity limits and safety measures with patient and family. Provide visual clues: red socks.

## 2020-12-02 NOTE — ED ADULT NURSE NOTE - OBJECTIVE STATEMENT
Patient is a 85 year old male complaining of fall. Patient fell asleep and woke up on the floor with his side table underneath him- Patient is not complaining of any pain, but said his daughter was forcing him to come in.

## 2020-12-02 NOTE — H&P ADULT - NSHPLABSRESULTS_GEN_ALL_CORE
15.9   6.61  )-----------( 143      ( 02 Dec 2020 00:35 )             49.7           138  |  102  |  29<H>  ----------------------------<  146<H>  5.3<H>   |  21  |  2.1<H>    Ca    8.3<L>      02 Dec 2020 00:35    TPro  7.3  /  Alb  4.2  /  TBili  0.3  /  DBili  x   /  AST  39  /  ALT  40  /  AlkPhos  98            Urinalysis Basic - ( 02 Dec 2020 04:15 )    Color: Yellow / Appearance: Clear / S.027 / pH: x  Gluc: x / Ketone: Negative  / Bili: Negative / Urobili: <2 mg/dL   Blood: x / Protein: 100 mg/dL / Nitrite: Negative   Leuk Esterase: Negative / RBC: 3 /HPF / WBC 3 /HPF   Sq Epi: x / Non Sq Epi: 1 /HPF / Bacteria: Negative      PT/INR - ( 02 Dec 2020 00:35 )   PT: 12.20 sec;   INR: 1.06 ratio         PTT - ( 02 Dec 2020 00:35 )  PTT:28.7 sec    Lactate Trend   @ 00:35 Lactate:1.8       CARDIAC MARKERS ( 02 Dec 2020 00:35 )  x     / <0.01 ng/mL / 393 U/L / x     / x        POCT Blood Glucose.: 130 mg/dL (02 Dec 2020 01:18)      < from: CT Chest No Cont (20 @ 02:21) >    IMPRESSION:    No noncontrast CT evidence of acute intrathoracic or abdominopelvic pathology.    4.5 cm ascending thoracic aortic aneurysm.    < end of copied text >    < from: CT Cervical Spine No Cont (20 @ 02:13) >    IMPRESSION:    No evidence of acute cervical spine fracture or subluxation.    Additional Findings/Recommendations After Attending Radiologist Review:  Agree with the above noting moderate to severe multilevel cervical stenosis worse at C3-C4 secondary to disc osteophyte complex and bilateral uncinate spurring.    < end of copied text >    < from: CT Head No Cont (20 @ 02:09) >    IMPRESSION:    No evidence for acute intracranial hemorrhage or acute calvarial fracture.    < end of copied text >    < from: Xray Pelvis AP only (20 @ 01:13) >    IMPRESSION:    No evidence of acute fracture.    Chronic findings as above.    < end of copied text >

## 2020-12-02 NOTE — H&P ADULT - NSHPPHYSICALEXAM_GEN_ALL_CORE
GENERAL: NAD, Resting in bed  HEENT: NCAT  CHEST/LUNG: Clear to auscultation bilaterally; No wheezing or rubs.   HEART: Regular rate and rhythm; No murmurs, rubs, or gallops  ABDOMEN: Bowel sounds present; Soft, Nontender, Nondistended.   EXTREMITIES:  No clubbing, cyanosis, or edema  NERVOUS SYSTEM:  Alert & Oriented X3  MSK: FROM all 4 extremities, full and equal strength  SKIN: No rashes or lesions

## 2020-12-02 NOTE — ED ADULT TRIAGE NOTE - CHIEF COMPLAINT QUOTE
Pt s/p unwitnessed fall out of the bed, unknown LOC, unknown head injury. No anticoagulants. Pt is COVID "+" 3 days ago.

## 2020-12-02 NOTE — H&P ADULT - HISTORY OF PRESENT ILLNESS
86 yo M with PMHx of HTN, HLD, CHF, hypothyroidism and recent COVID diagnosis presents to the ED for evaluation s/p fall. Pt states that he was sleeping, when he awoke on the floor. Pt states that he fell from bed to floor and a dresser also fell on him.     ED Vitals: /93 HR 89 RR 18 T99.4f SPO2 96% on RA  Pan CT scan negative for acute trauma  CT cervical spine: severe stenosis @ multilevel  CTAP/CTC: 4.5cm Thoracic Aortic Aneurysm  Received [id number] and Tylenol 86 yo M with PMHx of HTN, CAD s/p stents x2, HLD, CHF, hypothyroidism, ?Polio and recent COVID19 diagnosis presents to the ED s/p unwitnessed fall/syncope. Pt states that he went to sleep around 10:00pm and then he woke up on the floor around 11:00pm. He had soiled himself. Pt states that he fell from bed to floor and a dresser also fell on him. He called his daughter and then she called an ambulance for him. He also reports that certain family members tested positive for covid19 so he got himself tested and came back positive about 3 days ago. He only has dry cough. Denies HA, fatigue, fever, chills, dizziness, change in vision, runny nose, sore throat, CP, SOB, wheezing, abd pain, nausea, vomiting, diarrhea, constipation, blood in stool or urine, and dysuria.     ED Vitals: /93 HR 89 RR 18 T99.4f SPO2 96% on RA  Pan CT scan negative for acute trauma  CT cervical spine: severe stenosis @ multilevel  CTAP/CTC: 4.5cm Thoracic Aortic Aneurysm  Received [id number] and Tylenol

## 2020-12-02 NOTE — CONSULT NOTE ADULT - SUBJECTIVE AND OBJECTIVE BOX
Neurology Consult    Patient is a 85y old  Male who presents with a chief complaint of Syncope (02 Dec 2020 05:51)      HPI:  84 yo M with PMHx of HTN, CAD s/p stents x2, HLD, CHF, hypothyroidism, ?Polio and recent COVID19 diagnosis presents to the ED s/p unwitnessed fall/syncope. Pt states that he went to sleep around 10:00pm and then he woke up on the floor around 11:00pm. He had soiled himself. Pt states that he fell from bed to floor and a dresser also fell on him. He called his daughter and then she called an ambulance for him. He also reports that certain family members tested positive for covid19 so he got himself tested and came back positive about 3 days ago. He only has dry cough. Denies HA, fatigue, fever, chills, dizziness, change in vision, runny nose, sore throat, CP, SOB, wheezing, abd pain, nausea, vomiting, diarrhea, constipation, blood in stool or urine, and dysuria.     ED Vitals: /93 HR 89 RR 18 T99.4f SPO2 96% on RA  Pan CT scan negative for acute trauma  CT cervical spine: severe stenosis @ multilevel  CTAP/CTC: 4.5cm Thoracic Aortic Aneurysm  Received [id number] and Tylenol  (02 Dec 2020 05:51)      PAST MEDICAL & SURGICAL HISTORY:  HLD (hyperlipidemia)    CAD (coronary artery disease)    Polio    CHF (congestive heart failure)    Hypothyroid    History of surgery  cardiac stents    History of bilateral knee replacement    H/O bilateral hip replacements        FAMILY HISTORY:  No pertinent family history in first degree relatives        Social History: (-) x 3    Allergies    OxyContin (Vomiting)    Intolerances        MEDICATIONS  (STANDING):  amLODIPine   Tablet 10 milliGRAM(s) Oral daily  aspirin  chewable 81 milliGRAM(s) Oral daily  chlorhexidine 4% Liquid 1 Application(s) Topical <User Schedule>  folic acid 1 milliGRAM(s) Oral daily  furosemide    Tablet 20 milliGRAM(s) Oral daily  heparin   Injectable 5000 Unit(s) SubCutaneous every 8 hours  levothyroxine 25 MICROGram(s) Oral daily  pantoprazole    Tablet 40 milliGRAM(s) Oral before breakfast  simvastatin 40 milliGRAM(s) Oral at bedtime    MEDICATIONS  (PRN):      Review of systems:    Constitutional: as per HPI  Eyes: No eye pain or discharge  ENMT:  No difficulty hearing; No sinus or throat pain  Neck: No pain or stiffness  Respiratory: No cough, wheezing, chills or hemoptysis  Cardiovascular: No chest pain, palpitations, shortness of breath, dyspnea on exertion  Gastrointestinal: No abdominal pain, nausea, vomiting or hematemesis; No diarrhea or constipation.   Genitourinary: No dysuria, frequency, hematuria or incontinence  Neurological: As per HPI  Skin: No rashes or lesions   Endocrine: No heat or cold intolerance; No hair loss  Musculoskeletal: No joint pain or swelling  Psychiatric: No depression, anxiety, mood swings  Heme/Lymph: No easy bruising or bleeding gums    Vital Signs Last 24 Hrs  T(C): 37.7 (02 Dec 2020 14:09), Max: 38.1 (02 Dec 2020 00:40)  T(F): 99.8 (02 Dec 2020 14:09), Max: 100.6 (02 Dec 2020 00:40)  HR: 92 (02 Dec 2020 06:00) (84 - 92)  BP: 130/73 (02 Dec 2020 06:00) (117/79 - 184/93)  BP(mean): --  RR: 18 (02 Dec 2020 06:00) (18 - 20)  SpO2: 98% (02 Dec 2020 06:00) (96% - 98%)    Examination:  General:  Appearance is consistent with chronologic age.  No abnormal facies.  Gross skin survey within normal limits.    Cognitive/Language:  The patient is oriented to person, place, time and date.  Recent and remote memory intact.  Fund of knowledge is intact and normal.  Language with normal repetition, comprehension and naming.  Nondysarthric.    Eyes: EOMI w/o nystagmus, skew or reported double vision.  PERRL.  No ptosis/weakness of eyelid closure.    Face:  Facial sensation normal V1 - 3, no facial asymmetry.    Ears/Nose/Throat:  Hearing grossly intact b/l.    Motor examination: LLE atrophy otherwise normal tone, bulk and range of motion.  No tenderness, twitching, tremors or involuntary movements.  Formal Muscle Strength Testing: (MRC grade R/L) 5/5 UE; 5/5 LE.  No observable drift.  Reflexes: Clonus absent.  Sensory examination:   Intact to light touch in all extremities.  Cerebellum:   FTN/HKS intact with normal DIOR in all limbs.  No dysmetria. Gait Deferred      Labs:   CBC Full  -  ( 02 Dec 2020 12:35 )  WBC Count : 4.69 K/uL  RBC Count : 6.04 M/uL  Hemoglobin : 15.8 g/dL  Hematocrit : 49.5 %  Platelet Count - Automated : 131 K/uL  Mean Cell Volume : 82.0 fL  Mean Cell Hemoglobin : 26.2 pg  Mean Cell Hemoglobin Concentration : 31.9 g/dL  Auto Neutrophil # : 3.32 K/uL  Auto Lymphocyte # : 0.87 K/uL  Auto Monocyte # : 0.47 K/uL  Auto Eosinophil # : 0.01 K/uL  Auto Basophil # : 0.01 K/uL  Auto Neutrophil % : 70.8 %  Auto Lymphocyte % : 18.6 %  Auto Monocyte % : 10.0 %  Auto Eosinophil % : 0.2 %  Auto Basophil % : 0.2 %        136  |  103  |  25<H>  ----------------------------<  127<H>  4.8   |  22  |  1.7<H>    Ca    8.3<L>      02 Dec 2020 12:35  Mg     2.1     12    TPro  6.8  /  Alb  3.6  /  TBili  1.0  /  DBili  x   /  AST  44<H>  /  ALT  35  /  AlkPhos  90  12    LIVER FUNCTIONS - ( 02 Dec 2020 12:35 )  Alb: 3.6 g/dL / Pro: 6.8 g/dL / ALK PHOS: 90 U/L / ALT: 35 U/L / AST: 44 U/L / GGT: x           PT/INR - ( 02 Dec 2020 00:35 )   PT: 12.20 sec;   INR: 1.06 ratio         PTT - ( 02 Dec 2020 00:35 )  PTT:28.7 sec  Urinalysis Basic - ( 02 Dec 2020 04:15 )    Color: Yellow / Appearance: Clear / S.027 / pH: x  Gluc: x / Ketone: Negative  / Bili: Negative / Urobili: <2 mg/dL   Blood: x / Protein: 100 mg/dL / Nitrite: Negative   Leuk Esterase: Negative / RBC: 3 /HPF / WBC 3 /HPF   Sq Epi: x / Non Sq Epi: 1 /HPF / Bacteria: Negative          Neuroimaging:  NCHCT: CT Head No Cont:   EXAM:  CT BRAIN            PROCEDURE DATE:  2020            INTERPRETATION:  Clinical History / Reason for exam: Trauma.    Technique: Noncontrast head CT.  Contiguous unenhanced CT axial images of the head from the base to the vertex with coronal and sagittal reformats.    Comparison: Noncontrast head CT dated 2015    Findings:    The ventricles and cortical sulci are prominent, consistent with diffuse parenchymal volume loss. There is no evidence for acute intracranial hemorrhage, midline shift, mass effect, or loss of gray-white differentiation to suggest acute territorial infarction. No extra-axial fluid collection is seen.    Ethmoid air cells mucosal thickening and left maxillary sinus mucus retention cyst. The visualized orbital structures are unremarkable. No evidence of acute calvarial fracture.    IMPRESSION:    No evidence for acute intracranial hemorrhage or acute calvarial fracture.       Neurology Consult    Patient is a 85y old  Male who presents with a chief complaint of Syncope (02 Dec 2020 05:51)    HPI:  86 yo M with PMHx of HTN, CAD s/p stents x2, HLD, CHF, hypothyroidism, ?Polio w/ residual LE weakness L > R ambulates with walker/cane at baseline recently diagnosed with COVID19 presents to the ED s/p unwitnessed fall/syncope. Pt states that he went to sleep around 10:00pm and then he woke up on the floor around 11:00pm. 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  Pt states that he fell from bed to floor and a dresser also fell on him. He called his daughter and then she called an ambulance for him. He also reports that certain family members tested positive for covid19 so he got himself tested and came back positive about 3 days ago. He only has dry cough. Denies HA, fatigue, fever, chills, dizziness, change in vision, runny nose, sore throat, CP, SOB, wheezing, abd pain, nausea, vomiting, diarrhea, constipation, blood in stool or urine, and dysuria.     ED Vitals: /93 HR 89 RR 18 T99.4f SPO2 96% on RA  Pan CT scan negative for acute trauma  CT cervical spine: severe stenosis @ multilevel  CTAP/CTC: 4.5cm Thoracic Aortic Aneurysm  Received [id number] and Tylenol  (02 Dec 2020 05:51)      PAST MEDICAL & SURGICAL HISTORY:  HLD (hyperlipidemia)    CAD (coronary artery disease)    Polio    CHF (congestive heart failure)    Hypothyroid    History of surgery  cardiac stents    History of bilateral knee replacement    H/O bilateral hip replacements        FAMILY HISTORY:  No pertinent family history in first degree relatives        Social History: (-) x 3    Allergies    OxyContin (Vomiting)    Intolerances        MEDICATIONS  (STANDING):  amLODIPine   Tablet 10 milliGRAM(s) Oral daily  aspirin  chewable 81 milliGRAM(s) Oral daily  chlorhexidine 4% Liquid 1 Application(s) Topical <User Schedule>  folic acid 1 milliGRAM(s) Oral daily  furosemide    Tablet 20 milliGRAM(s) Oral daily  heparin   Injectable 5000 Unit(s) SubCutaneous every 8 hours  levothyroxine 25 MICROGram(s) Oral daily  pantoprazole    Tablet 40 milliGRAM(s) Oral before breakfast  simvastatin 40 milliGRAM(s) Oral at bedtime    MEDICATIONS  (PRN):      Review of systems:    Constitutional: as per HPI  Eyes: No eye pain or discharge  ENMT:  No difficulty hearing; No sinus or throat pain  Neck: No pain or stiffness  Respiratory: No cough, wheezing, chills or hemoptysis  Cardiovascular: No chest pain, palpitations, shortness of breath, dyspnea on exertion  Gastrointestinal: No abdominal pain, nausea, vomiting or hematemesis; No diarrhea or constipation.   Genitourinary: No dysuria, frequency, hematuria or incontinence  Neurological: As per HPI  Skin: No rashes or lesions   Endocrine: No heat or cold intolerance; No hair loss  Musculoskeletal: No joint pain or swelling  Psychiatric: No depression, anxiety, mood swings  Heme/Lymph: No easy bruising or bleeding gums    Vital Signs Last 24 Hrs  T(C): 37.7 (02 Dec 2020 14:09), Max: 38.1 (02 Dec 2020 00:40)  T(F): 99.8 (02 Dec 2020 14:09), Max: 100.6 (02 Dec 2020 00:40)  HR: 92 (02 Dec 2020 06:00) (84 - 92)  BP: 130/73 (02 Dec 2020 06:00) (117/79 - 184/93)  BP(mean): --  RR: 18 (02 Dec 2020 06:00) (18 - 20)  SpO2: 98% (02 Dec 2020 06:00) (96% - 98%)    Examination:  General:  Appearance is consistent with chronologic age.  No abnormal facies.  Gross skin survey within normal limits.    Cognitive/Language:  The patient is oriented to person, place, time and date.  Recent and remote memory intact.  Fund of knowledge is intact and normal.  Language with normal repetition, comprehension and naming.  Nondysarthric.    Eyes: EOMI w/o nystagmus, skew or reported double vision.  PERRL.  No ptosis/weakness of eyelid closure.    Face:  Facial sensation normal V1 - 3, no facial asymmetry.    Ears/Nose/Throat:  Hearing grossly intact b/l.    Motor examination: LLE atrophy otherwise normal tone, bulk and range of motion.  No tenderness, twitching, tremors or involuntary movements.  Formal Muscle Strength Testing: (MRC grade R/L) 5/5 UE; 4/5 LE b/l LE with atrophy L > R and foreshortening LLE.  No observable drift.  Reflexes: Clonus absent.  Sensory examination:   Intact to light touch in all extremities.  Cerebellum:   FTN/HKS intact with normal DIOR in all limbs.  No dysmetria. Gait Deferred    Labs:   CBC Full  -  ( 02 Dec 2020 12:35 )  WBC Count : 4.69 K/uL  RBC Count : 6.04 M/uL  Hemoglobin : 15.8 g/dL  Hematocrit : 49.5 %  Platelet Count - Automated : 131 K/uL  Mean Cell Volume : 82.0 fL  Mean Cell Hemoglobin : 26.2 pg  Mean Cell Hemoglobin Concentration : 31.9 g/dL  Auto Neutrophil # : 3.32 K/uL  Auto Lymphocyte # : 0.87 K/uL  Auto Monocyte # : 0.47 K/uL  Auto Eosinophil # : 0.01 K/uL  Auto Basophil # : 0.01 K/uL  Auto Neutrophil % : 70.8 %  Auto Lymphocyte % : 18.6 %  Auto Monocyte % : 10.0 %  Auto Eosinophil % : 0.2 %  Auto Basophil % : 0.2 %    12    136  |  103  |  25<H>  ----------------------------<  127<H>  4.8   |  22  |  1.7<H>    Ca    8.3<L>      02 Dec 2020 12:35  Mg     2.1     12    TPro  6.8  /  Alb  3.6  /  TBili  1.0  /  DBili  x   /  AST  44<H>  /  ALT  35  /  AlkPhos  90  12    LIVER FUNCTIONS - ( 02 Dec 2020 12:35 )  Alb: 3.6 g/dL / Pro: 6.8 g/dL / ALK PHOS: 90 U/L / ALT: 35 U/L / AST: 44 U/L / GGT: x           PT/INR - ( 02 Dec 2020 00:35 )   PT: 12.20 sec;   INR: 1.06 ratio         PTT - ( 02 Dec 2020 00:35 )  PTT:28.7 sec  Urinalysis Basic - ( 02 Dec 2020 04:15 )    Color: Yellow / Appearance: Clear / S.027 / pH: x  Gluc: x / Ketone: Negative  / Bili: Negative / Urobili: <2 mg/dL   Blood: x / Protein: 100 mg/dL / Nitrite: Negative   Leuk Esterase: Negative / RBC: 3 /HPF / WBC 3 /HPF   Sq Epi: x / Non Sq Epi: 1 /HPF / Bacteria: Negative    Neuroimaging:  NCHCT: CT Head No Cont:   EXAM:  CT BRAIN          PROCEDURE DATE:  2020      INTERPRETATION:  Clinical History / Reason for exam: Trauma.    Technique: Noncontrast head CT.  Contiguous unenhanced CT axial images of the head from the base to the vertex with coronal and sagittal reformats.    Comparison: Noncontrast head CT dated 2015    Findings:    The ventricles and cortical sulci are prominent, consistent with diffuse parenchymal volume loss. There is no evidence for acute intracranial hemorrhage, midline shift, mass effect, or loss of gray-white differentiation to suggest acute territorial infarction. No extra-axial fluid collection is seen.    Ethmoid air cells mucosal thickening and left maxillary sinus mucus retention cyst. The visualized orbital structures are unremarkable. No evidence of acute calvarial fracture.    IMPRESSION:    No evidence for acute intracranial hemorrhage or acute calvarial fracture.

## 2020-12-02 NOTE — CHART NOTE - NSCHARTNOTEFT_GEN_A_CORE
MRI cancelled due to presence of penile prosthesis. Can proceed if needed once documentation about compatibility obtained per attending radiologist.

## 2020-12-02 NOTE — ED PROVIDER NOTE - OBJECTIVE STATEMENT
86 yo M with PMHx of HTN, HLD, CHF, hypothyroidism and recent COVID diagnosis presents to the ED for evaluation s/p fall. Pt states he went to sleep and somehow ended up on the floor and then a dresser fell on top of him. Pt denies any complaints at this time. Pt denies fever, chills, nausea, vomiting, abdominal pain, diarrhea, headache, dizziness, weakness, chest pain, SOB, back pain, urinary symptoms, cough, calf pain/swelling, recent travel, recent surgery.

## 2020-12-02 NOTE — H&P ADULT - NSICDXPASTMEDICALHX_GEN_ALL_CORE_FT
PAST MEDICAL HISTORY:  CHF (congestive heart failure)     Hypothyroid     Polio      PAST MEDICAL HISTORY:  CAD (coronary artery disease)     CHF (congestive heart failure)     HLD (hyperlipidemia)     Hypothyroid     Polio

## 2020-12-02 NOTE — H&P ADULT - ATTENDING COMMENTS
HPI as above.  Interval history: Pt seen and examined at bedside. No cp or sob.  Pt states that he had a bad dream and fell on the floor. He states that he was down on the floor for hours. I spoke to the daughter and she says that he went to bed at 10pm then she heard screaming at 1020 and she found him on the floor lethargic labored breathing and incoherent with feces and urine on the floor.   Vital Signs (24 Hrs):  T(C): 36.6 (12-02-20 @ 06:00), Max: 38.1 (12-02-20 @ 00:40)  HR: 92 (12-02-20 @ 06:00) (84 - 92)  BP: 130/73 (12-02-20 @ 06:00) (117/79 - 184/93)  RR: 18 (12-02-20 @ 06:00) (18 - 20)  SpO2: 98% (12-02-20 @ 06:00) (96% - 98%)  Wt(kg): --  Daily Height in cm: 157.48 (02 Dec 2020 00:15)    Daily     I&O's Summary    02 Dec 2020 07:01  -  02 Dec 2020 12:14  --------------------------------------------------------  IN: 400 mL / OUT: 700 mL / NET: -300 mL      PHYSICAL EXAM:  GENERAL: NAD, well-developed  HEAD:  Atraumatic, Normocephalic  EYES: EOMI, PERRLA, conjunctiva and sclera clear  NECK: Supple, No JVD  CHEST/LUNG: Clear to auscultation bilaterally; No wheeze  HEART: Regular rate and rhythm; No murmurs, rubs, or gallops  ABDOMEN: Soft, Nontender, Nondistended; Bowel sounds present  EXTREMITIES:  2+ Peripheral Pulses, No clubbing, cyanosis, or edema  PSYCH: AAOx3  NEUROLOGY: non-focal  SKIN: No rashes or lesions    Labs reviewed  Imaging reviewed: < from: CT Chest No Cont (12.02.20 @ 02:21) >    IMPRESSION:    No noncontrast CT evidence of acute intrathoracic or abdominopelvic pathology.    4.5 cm ascending thoracic aortic aneurysm.    Additional incidental findings as above.    < end of copied text >  < from: CT Head No Cont (12.02.20 @ 02:09) >    IMPRESSION:    No evidence for acute intracranial hemorrhage or acute calvarial fracture.    < end of copied text >    EKG reviewed: < from: 12 Lead ECG (12.02.20 @ 00:49) >    Diagnosis Line Sinus rhythm with occasional Premature ventricular complexes  Right axis deviation  Abnormal ECG      Plan  #fall suspected seizure  - 2nd episode  - will check eeg and mri  - neuro consult for possible starting szr meds   - continue cardiac telemonitoring, although unlikely cardiac related, monitor for arrythmia, trop neg, no cp     #Carol on ckd 3- no hydro on US, follow up cr at 11    #suspected HFpEF- will check PBNP, previous wnl, no echo on file and covid positive unable top get full Echo as per protocol, not in CHF exacerbation     #covid positive- no PNA- stable on RA during encounter     #rest as above    #Progress Note Handoff  Pending (specify): neuro, PT  Family discussion: dw pt daughter Marylou at 1215 and updated on plan   Disposition: Home_x__/SNF___/Other________/Unknown at this time________

## 2020-12-02 NOTE — ED PROVIDER NOTE - PHYSICAL EXAMINATION
VITAL SIGNS: I have reviewed nursing notes and confirm.  CONSTITUTIONAL: Elderly male laying on stretcher; in no acute distress.  SKIN: Skin exam is warm and dry, no acute rash.  HEAD: Normocephalic; atraumatic.  EYES: PERRL, EOM intact; conjunctiva and sclera clear.  ENT: No nasal discharge; airway clear.  NECK: Supple; non tender. No midline TTP.   CARD: S1, S2 normal; no murmurs, gallops, or rubs. Regular rate and rhythm.  RESP: No wheezes, rales or rhonchi. Speaking in full sentences.   BACK: No midline or paraspinal TTP.   ABD: Normal bowel sounds; soft; non-distended; non-tender; No rebound or guarding. No CVA tenderness.  EXT: Normal ROM. No clubbing, cyanosis or edema. Pelvis stable. No joint deformity, ecchymosis or erythema.   NEURO: Alert, oriented. Grossly unremarkable. No focal deficits.

## 2020-12-02 NOTE — H&P ADULT - ASSESSMENT
86 yo M with PMHx of HTN, HLD, CHF, hypothyroidism and recent COVID diagnosis presents to the ED for evaluation s/p fall. 84 yo M with PMHx of HTN, CAD s/p stents x2, HLD, CHF, hypothyroidism, ?Polio and recent COVID19 diagnosis presents to the ED s/p unwitnessed fall/syncope.    # Unwitnessed fall/Syncope, r/o seizure   - Pan CT scan negative for acute trauma  - woke up on the floor and had soiled himself   - check orthostatic vs, EEG, Echo, Carotid duplex  - EKG: NSR   - Telemetry for 24 hours, Trend Cardiac enzymes    # dry cough 2/2 COVID19 PNA  - afebrile, lymphopenia, on supplemental O2  - f/u Procal, ferritin, LDH, CRP, blood and urine cx, D-dimer  - start dexamethasone and Remdesivir contraindicated (GFR<30)    # LOLI on CKD III  - baseline creatinine 1.8  - avoid nephrotoxic agents  - Gentle hydration     # CHF (unknown type) - not in exacerbation  - f/u Echo  - c/w lasix    # CAD s/p stents x2// HLD  - Cont ASA, Statin, not on bb? may benefit w/ low dose bb     # HTN - elevated, hold ACEI, c/w ccb  # Ascending Thoracic Aortic Aneurysm on CT scan - o/p Vascular f/u  # Severe Cervical spinal stenosis - pt asymptomatic, consult neurosx    # DVT ppx - heparin sq  # GI ppx - PPI  # Diet - DASH  Full code

## 2020-12-02 NOTE — ED PROVIDER NOTE - ATTENDING CONTRIBUTION TO CARE
85 yr old m w/ a pmh significant for htn, hld, CHF, hypothyroid, recent COVID diagnosis who presents s/p fall. Pt states that he was sleeping, when he awoke on the floor. Pt states that he fell from bed to floor and a dresser also fell on him. Pt denies any medical complaints at presentation. Pt also denies any chest pain, SOB, nausea, vomiting, fevers, chills or any other complaints.     Review of Systems    Constitutional: (-) fever  Cardiovascular: (-) chest pain, (-) syncope  Respiratory: (-) cough, (-) shortness of breath  Gastrointestinal: (-) vomiting, (-) diarrhea, (-) abdominal pain  Musculoskeletal: (-) neck pain, (-) back pain, (-) joint pain  Integumentary: (-) rash, (-) edema  Neurological: (-) headache, (-) altered mental status    Except as documented in the HPI, all other systems are negative.    VITAL SIGNS: I have reviewed nursing notes and confirm.  CONSTITUTIONAL: non-toxic, well appearing  SKIN: no rash, no petechiae.  EYES: PERRL, EOMI, pink conjunctiva, anicteric  ENT: tongue midline, no exudates, MMM  NECK: Supple; no meningismus, no JVD  CARD: RRR, no murmurs, equal radial pulses bilaterally 2+  RESP: CTAB, no respiratory distress  ABD: Soft, non-tender, non-distended, no peritoneal signs, no HSM, no CVA tenderness  EXT: Normal ROM x4. No edema. No calves tenderness  NEURO: Alert, oriented x3. CN2-12 intact, equal strength bilaterally.  PSYCH: Cooperative, appropriate.    a/p  85 yr old m that presents due to trauma, unwitnessed fall, with possible dresser involved   -labs  -imaging  -ekg  -dispo as per above 85 yr old m w/ a pmh significant for htn, hld, CHF, hypothyroid, recent COVID diagnosis who presents s/p fall. Pt states that he was sleeping, when he awoke on the floor. Pt states that he fell from bed to floor and a dresser also fell on him. Pt denies any medical complaints at presentation. Pt also denies any chest pain, SOB, nausea, vomiting, fevers, chills or any other complaints.     Review of Systems    Constitutional: (-) fever  Cardiovascular: (-) chest pain, (-) syncope  Respiratory: (-) cough, (-) shortness of breath  Gastrointestinal: (-) vomiting, (-) diarrhea, (-) abdominal pain  Musculoskeletal: (-) neck pain, (-) back pain, (-) joint pain  Integumentary: (-) rash, (-) edema  Neurological: (-) headache, (-) altered mental status    Except as documented in the HPI, all other systems are negative.    VITAL SIGNS: I have reviewed nursing notes and confirm.  CONSTITUTIONAL: non-toxic, well appearing  SKIN: no rash, no petechiae.  EYES: PERRL, EOMI, pink conjunctiva, anicteric  ENT: tongue midline, no exudates, MMM  NECK: Supple; no meningismus, no JVD  CARD: RRR, no murmurs, equal radial pulses bilaterally 2+  RESP: CTAB, no respiratory distress  ABD: Soft, non-tender, non-distended, no peritoneal signs, no HSM, no CVA tenderness  EXT: Normal ROM x4. No edema. No calves tenderness  NEURO: Alert, oriented x3. CN2-12 intact, equal strength bilaterally.  PSYCH: Cooperative, appropriate.    a/p  85 yr old m that presents due to trauma, unwitnessed fall, with possible dresser involved   -labs  -imaging  -ekg  -CO reading via respiratory  -dispo as per above

## 2020-12-02 NOTE — ED PROVIDER NOTE - WET READ LAUNCH FT
There are no Wet Read(s) to document. There are 2 Wet Read(s) to document. There is 1 Wet Read(s) to document.

## 2020-12-02 NOTE — ED PROVIDER NOTE - CLINICAL SUMMARY MEDICAL DECISION MAKING FREE TEXT BOX
Authored by Dr. Laurie Schuster: pt s/o to me by Dr. Vela - 85M p/w fall from bed, dresser fell on him, ?syncope, known +covid, pt s/o to me pending labs & imaging, no acute injuries or findings on ur/labs - admitted for further w/u of syncope

## 2020-12-02 NOTE — ED PROVIDER NOTE - CARE PLAN
Principal Discharge DX:	Syncope  Secondary Diagnosis:	Fall, initial encounter  Secondary Diagnosis:	COVID-19

## 2020-12-03 LAB
ALBUMIN SERPL ELPH-MCNC: 3.9 G/DL — SIGNIFICANT CHANGE UP (ref 3.5–5.2)
ALP SERPL-CCNC: 96 U/L — SIGNIFICANT CHANGE UP (ref 30–115)
ALT FLD-CCNC: 32 U/L — SIGNIFICANT CHANGE UP (ref 0–41)
ANION GAP SERPL CALC-SCNC: 14 MMOL/L — SIGNIFICANT CHANGE UP (ref 7–14)
AST SERPL-CCNC: 36 U/L — SIGNIFICANT CHANGE UP (ref 0–41)
BILIRUB DIRECT SERPL-MCNC: 0.2 MG/DL — SIGNIFICANT CHANGE UP (ref 0–0.2)
BILIRUB INDIRECT FLD-MCNC: 0.3 MG/DL — SIGNIFICANT CHANGE UP (ref 0.2–1.2)
BILIRUB SERPL-MCNC: 0.5 MG/DL — SIGNIFICANT CHANGE UP (ref 0.2–1.2)
BLD GP AB SCN SERPL QL: SIGNIFICANT CHANGE UP
BLD GP AB SCN SERPL QL: SIGNIFICANT CHANGE UP
BUN SERPL-MCNC: 25 MG/DL — HIGH (ref 10–20)
CALCIUM SERPL-MCNC: 8.4 MG/DL — LOW (ref 8.5–10.1)
CHLORIDE SERPL-SCNC: 99 MMOL/L — SIGNIFICANT CHANGE UP (ref 98–110)
CO2 SERPL-SCNC: 22 MMOL/L — SIGNIFICANT CHANGE UP (ref 17–32)
CREAT SERPL-MCNC: 1.8 MG/DL — HIGH (ref 0.7–1.5)
CREAT SERPL-MCNC: 2.1 MG/DL — HIGH (ref 0.7–1.5)
CRP SERPL-MCNC: 2.44 MG/DL — HIGH (ref 0–0.4)
CULTURE RESULTS: SIGNIFICANT CHANGE UP
GLUCOSE SERPL-MCNC: 126 MG/DL — HIGH (ref 70–99)
HCT VFR BLD CALC: 53.9 % — HIGH (ref 42–52)
HGB BLD-MCNC: 17.3 G/DL — SIGNIFICANT CHANGE UP (ref 14–18)
MCHC RBC-ENTMCNC: 26.5 PG — LOW (ref 27–31)
MCHC RBC-ENTMCNC: 32.1 G/DL — SIGNIFICANT CHANGE UP (ref 32–37)
MCV RBC AUTO: 82.7 FL — SIGNIFICANT CHANGE UP (ref 80–94)
NRBC # BLD: 0 /100 WBCS — SIGNIFICANT CHANGE UP (ref 0–0)
PLATELET # BLD AUTO: 123 K/UL — LOW (ref 130–400)
POTASSIUM SERPL-MCNC: 4.6 MMOL/L — SIGNIFICANT CHANGE UP (ref 3.5–5)
POTASSIUM SERPL-SCNC: 4.6 MMOL/L — SIGNIFICANT CHANGE UP (ref 3.5–5)
PROCALCITONIN SERPL-MCNC: 0.17 NG/ML — HIGH (ref 0.02–0.1)
PROT SERPL-MCNC: 7.3 G/DL — SIGNIFICANT CHANGE UP (ref 6–8)
RBC # BLD: 6.52 M/UL — HIGH (ref 4.7–6.1)
RBC # FLD: 15.1 % — HIGH (ref 11.5–14.5)
SODIUM SERPL-SCNC: 135 MMOL/L — SIGNIFICANT CHANGE UP (ref 135–146)
SPECIMEN SOURCE: SIGNIFICANT CHANGE UP
T4 FREE SERPL-MCNC: 0.9 NG/DL — SIGNIFICANT CHANGE UP (ref 0.9–1.8)
TSH SERPL-MCNC: 2.03 UIU/ML — SIGNIFICANT CHANGE UP (ref 0.27–4.2)
WBC # BLD: 5.86 K/UL — SIGNIFICANT CHANGE UP (ref 4.8–10.8)
WBC # FLD AUTO: 5.86 K/UL — SIGNIFICANT CHANGE UP (ref 4.8–10.8)

## 2020-12-03 PROCEDURE — 99233 SBSQ HOSP IP/OBS HIGH 50: CPT

## 2020-12-03 PROCEDURE — 93970 EXTREMITY STUDY: CPT | Mod: 26

## 2020-12-03 PROCEDURE — 71045 X-RAY EXAM CHEST 1 VIEW: CPT | Mod: 26

## 2020-12-03 RX ORDER — TIOTROPIUM BROMIDE 18 UG/1
1 CAPSULE ORAL; RESPIRATORY (INHALATION) DAILY
Refills: 0 | Status: DISCONTINUED | OUTPATIENT
Start: 2020-12-03 | End: 2020-12-08

## 2020-12-03 RX ORDER — REMDESIVIR 5 MG/ML
100 INJECTION INTRAVENOUS EVERY 24 HOURS
Refills: 0 | Status: COMPLETED | OUTPATIENT
Start: 2020-12-04 | End: 2020-12-07

## 2020-12-03 RX ORDER — DEXAMETHASONE 0.5 MG/5ML
6 ELIXIR ORAL DAILY
Refills: 0 | Status: DISCONTINUED | OUTPATIENT
Start: 2020-12-03 | End: 2020-12-08

## 2020-12-03 RX ORDER — REMDESIVIR 5 MG/ML
INJECTION INTRAVENOUS
Refills: 0 | Status: COMPLETED | OUTPATIENT
Start: 2020-12-03 | End: 2020-12-07

## 2020-12-03 RX ORDER — REMDESIVIR 5 MG/ML
200 INJECTION INTRAVENOUS EVERY 24 HOURS
Refills: 0 | Status: COMPLETED | OUTPATIENT
Start: 2020-12-03 | End: 2020-12-03

## 2020-12-03 RX ADMIN — SIMVASTATIN 40 MILLIGRAM(S): 20 TABLET, FILM COATED ORAL at 21:43

## 2020-12-03 RX ADMIN — Medication 6 MILLIGRAM(S): at 11:43

## 2020-12-03 RX ADMIN — HEPARIN SODIUM 5000 UNIT(S): 5000 INJECTION INTRAVENOUS; SUBCUTANEOUS at 05:47

## 2020-12-03 RX ADMIN — AMLODIPINE BESYLATE 10 MILLIGRAM(S): 2.5 TABLET ORAL at 05:48

## 2020-12-03 RX ADMIN — Medication 81 MILLIGRAM(S): at 11:44

## 2020-12-03 RX ADMIN — PANTOPRAZOLE SODIUM 40 MILLIGRAM(S): 20 TABLET, DELAYED RELEASE ORAL at 05:47

## 2020-12-03 RX ADMIN — CHLORHEXIDINE GLUCONATE 1 APPLICATION(S): 213 SOLUTION TOPICAL at 05:48

## 2020-12-03 RX ADMIN — Medication 20 MILLIGRAM(S): at 05:47

## 2020-12-03 RX ADMIN — HEPARIN SODIUM 5000 UNIT(S): 5000 INJECTION INTRAVENOUS; SUBCUTANEOUS at 16:23

## 2020-12-03 RX ADMIN — TIOTROPIUM BROMIDE 1 CAPSULE(S): 18 CAPSULE ORAL; RESPIRATORY (INHALATION) at 21:44

## 2020-12-03 RX ADMIN — REMDESIVIR 200 MILLIGRAM(S): 5 INJECTION INTRAVENOUS at 18:14

## 2020-12-03 RX ADMIN — Medication 1 MILLIGRAM(S): at 11:44

## 2020-12-03 RX ADMIN — Medication 25 MICROGRAM(S): at 05:47

## 2020-12-03 NOTE — PROGRESS NOTE ADULT - ASSESSMENT
# dry cough 2/2 COVID19 pneumoniaAD s/p stents x2, HLD, CHF, hypothyroidism, ?Polio and recent COVID19 diagnosis presents to the ED s/p unwitnessed fall/syncope.    # Unwitnessed fall/Syncope, r/o seizure   - Pan CT scan negative for acute trauma  - woke up on the floor and had soiled himself   - check orthostatic vs, EEG, Echo, Carotid duplex  - EKG: NSR  - no events on cardiac telemonitoring   -eeg normal, no need for mRI   - carotid US - wnl     # dry cough 2/2 COVID19 PNA  - spiked fevers in evening, few ground glass on CT, cxr unremarkable so far today   - covid labs stable for now, rechecking in am   - continue dexamethasone and   - starting RDV as per ID as CrCl improved.  - incentive spirometer and prone prn      # LOLI on CKD III  - baseline creatinine 1.7--- stable   - avoid nephrotoxic agents  - Gentle hydration     #suspected HFpEF- stable- unable to get echo    # CAD s/p stents x2// HLD  - Cont ASA, Statin, not on bb? may benefit w/ low dose bb     # HTN - elevated, hold ACEI, c/w ccb  # Ascending Thoracic Aortic Aneurysm on CT scan - o/p Vascular f/u  # Severe Cervical spinal stenosis - pt asymptomatic, consult neurosx    #Progress Note Handoff  Pending (specify): pending improvment    Family discussion:  shavon pt and family and agreed on plan   Disposition: Home__x_/SNF___/Other___/Unknown at this time___  Pt seen during COVID 19 Pandemic.

## 2020-12-03 NOTE — PROGRESS NOTE ADULT - SUBJECTIVE AND OBJECTIVE BOX
Pt seen and examined at bedside. No CP or SOB.Spiked fvers overnight. Dedsated, started on decadron     PAST MEDICAL & SURGICAL HISTORY:  HLD (hyperlipidemia)    CAD (coronary artery disease)    Polio    CHF (congestive heart failure)    Hypothyroid    History of surgery  cardiac stents    History of bilateral knee replacement    H/O bilateral hip replacements        VITAL SIGNS (Last 24 hrs):  T(C): 37.7 (20 @ 12:03), Max: 38.4 (20 @ 04:17)  HR: 88 (20 @ 04:17) (69 - 90)  BP: 162/88 (20 @ 04:17) (134/96 - 179/91)  RR: 16 (20 @ 04:17) (16 - 18)  SpO2: 91% (20 @ 04:17) (91% - 91%)  Wt(kg): --  Daily     Daily Weight in k.9 (03 Dec 2020 04:17)    I&O's Summary    02 Dec 2020 07:01  -  03 Dec 2020 07:00  --------------------------------------------------------  IN: 400 mL / OUT: 1100 mL / NET: -700 mL    03 Dec 2020 07:01  -  03 Dec 2020 13:11  --------------------------------------------------------  IN: 200 mL / OUT: 0 mL / NET: 200 mL    COVID Labs  C-Reactive Protein, Serum: 2.44 mg/dL (20 @ 12:35)    Procalcitonin, Serum: 0.17 ng/mL (20 @ 12:35)    D-Dimer:370 ng/mL DDU (20 @ 12:35)      PHYSICAL EXAM:  GENERAL: NAD, well-developed  HEAD:  Atraumatic, Normocephalic  EYES: EOMI, PERRLA, conjunctiva and sclera clear  NECK: Supple, No JVD  CHEST/LUNG: Clear to auscultation bilaterally; No wheeze  HEART: Regular rate and rhythm; No murmurs, rubs, or gallops  ABDOMEN: Soft, Nontender, Nondistended; Bowel sounds present  EXTREMITIES:  2+ Peripheral Pulses, No clubbing, cyanosis, or edema  PSYCH: AAOx3  NEUROLOGY: non-focal  SKIN: No rashes or lesions    Labs Reviewed  Spoke to patient in regards to abnormal labs.    CBC Full  -  ( 03 Dec 2020 08:32 )  WBC Count : 5.86 K/uL  Hemoglobin : 17.3 g/dL  Hematocrit : 53.9 %  Platelet Count - Automated : 123 K/uL  Mean Cell Volume : 82.7 fL  Mean Cell Hemoglobin : 26.5 pg  Mean Cell Hemoglobin Concentration : 32.1 g/dL  Auto Neutrophil # : x  Auto Lymphocyte # : x  Auto Monocyte # : x  Auto Eosinophil # : x  Auto Basophil # : x  Auto Neutrophil % : x  Auto Lymphocyte % : x  Auto Monocyte % : x  Auto Eosinophil % : x  Auto Basophil % : x    BMP:     @ 08:32    Blood Urea Nitrogen - 25  Calcium - 8.4  Carbond Dioxide - 22  Chloride - 99  Creatinine - 1.8  Glucose - 126  Potassium - 4.6  Sodium - 135      Hemoglobin A1c -   PT/INR - ( 02 Dec 2020 00:35 )   PT: 12.20 sec;   INR: 1.06 ratio         PTT - ( 02 Dec 2020 00:35 )  PTT:28.7 sec  Urine Culture:   @ 04:15 Urine culture: --    Culture Results:   <10,000 CFU/mL Normal Urogenital Temi  Method Type: --  Organism: --  Organism Identification: --  Specimen Source: .Urine Clean Catch (Midstream)        Imaging reviewed  cxr reviewed unchanged    MEDICATIONS  (STANDING):  amLODIPine   Tablet 10 milliGRAM(s) Oral daily  aspirin  chewable 81 milliGRAM(s) Oral daily  chlorhexidine 4% Liquid 1 Application(s) Topical <User Schedule>  dexAMETHasone  Injectable 6 milliGRAM(s) IV Push daily  folic acid 1 milliGRAM(s) Oral daily  furosemide    Tablet 20 milliGRAM(s) Oral daily  heparin   Injectable 5000 Unit(s) SubCutaneous every 8 hours  levothyroxine 25 MICROGram(s) Oral daily  pantoprazole    Tablet 40 milliGRAM(s) Oral before breakfast  simvastatin 40 milliGRAM(s) Oral at bedtime    MEDICATIONS  (PRN):

## 2020-12-03 NOTE — CONSULT NOTE ADULT - ASSESSMENT
ASSESSMENT  86 yo M with PMHx of HTN, CAD s/p stents x2, HLD, CHF, hypothyroidism, ?Polio and recent COVID19 diagnosis (+ a few days ago, exposure to + family members) presents to the ED s/p unwitnessed fall/syncope.    IMPRESSION  #Severe COVID19 PNA requiring supplemental O2 with Severe sepsis on admission T>101 P>90 LOLI    Procalcitonin, Serum: 0.17 ng/mL (12-02-20 @ 12:35)    CT atelectasis and some GGOs    WBC 4  #No current evidence of Cytokine Release Syndrome     C-Reactive Protein, Serum: 2.44 mg/dL (12-02-20 @ 12:35)    D-Dimer Assay, Quantitative: 370 ng/mL DDU (12-02-20 @ 12:35)  #Concern for seizure    Creatinine, Serum: 1.7 (12-02-20 @ 12:35) CrCl 41      RECOMMENDATIONS  This is an incomplete consult note. All recommendations to follow after interview and examination of the patient.   - As 91 % on RA, Remdesivir D1: 200mg x1, D2-5 100mg IV daily. Monitor Cr/LFTs. CrCL 41  - Convalescent Plasma: 2 units over 2h each if symptoms started within 7 days  - Dexamethasone 6mg daily x at least 10 days or until discharge (whichever is longer)  - A/C per primary team  - Prone positioning if possible     If any questions, please call or send a message on [employer] Teams  Spectra [phone]     ASSESSMENT  84 yo M with PMHx of HTN, CAD s/p stents x2, HLD, CHF, hypothyroidism, ?Polio and recent COVID19 diagnosis (+ a few days ago, exposure to + family members) presents to the ED s/p unwitnessed fall/syncope.    IMPRESSION  #Severe COVID19 PNA requiring supplemental O2 with Severe sepsis on admission T>101 P>90 LOLI    Procalcitonin, Serum: 0.17 ng/mL (12-02-20 @ 12:35)    CT atelectasis and some GGOs    WBC 4  #No current evidence of Cytokine Release Syndrome     C-Reactive Protein, Serum: 2.44 mg/dL (12-02-20 @ 12:35)    D-Dimer Assay, Quantitative: 370 ng/mL DDU (12-02-20 @ 12:35)  #Concern for seizure    Creatinine, Serum: 1.7 (12-02-20 @ 12:35) CrCl 41      RECOMMENDATIONS  - As 91 % on RA, Remdesivir D1: 200mg x1, D2-5 100mg IV daily. Monitor Cr/LFTs. CrCL 41  - Convalescent Plasma: 2 units over 2h each if symptoms started within 7 days  - Dexamethasone 6mg daily x at least 10 days or until discharge (whichever is longer)  - A/C per primary team  - Prone positioning if possible     If any questions, please call or send a message on [employer] Teams  Spectra [phone]

## 2020-12-03 NOTE — CONSULT NOTE ADULT - SUBJECTIVE AND OBJECTIVE BOX
AVA FERNANDEZ  85y, Male  Allergy: OxyContin (Vomiting)      CHIEF COMPLAINT:   Syncope (02 Dec 2020 14:21)      LOS  1d    HPI  HPI:  84 yo M with PMHx of HTN, CAD s/p stents x2, HLD, CHF, hypothyroidism, ?Polio and recent COVID19 diagnosis presents to the ED s/p unwitnessed fall/syncope. Pt states that he went to sleep around 10:00pm and then he woke up on the floor around 11:00pm. He had soiled himself. Pt states that he fell from bed to floor and a dresser also fell on him. He called his daughter and then she called an ambulance for him. He also reports that certain family members tested positive for covid19 so he got himself tested and came back positive about 3 days ago. He only has dry cough. Denies HA, fatigue, fever, chills, dizziness, change in vision, runny nose, sore throat, CP, SOB, wheezing, abd pain, nausea, vomiting, diarrhea, constipation, blood in stool or urine, and dysuria.     ED Vitals: /93 HR 89 RR 18 T99.4f SPO2 96% on RA  Pan CT scan negative for acute trauma  CT cervical spine: severe stenosis @ multilevel  CTAP/CTC: 4.5cm Thoracic Aortic Aneurysm  Received [id number] and Tylenol  (02 Dec 2020 05:51)      INFECTIOUS DISEASE HISTORY:   pending patient interview and exam     PMH  PAST MEDICAL & SURGICAL HISTORY:  HLD (hyperlipidemia)    CAD (coronary artery disease)    Polio    CHF (congestive heart failure)    Hypothyroid    History of surgery  cardiac stents    History of bilateral knee replacement    H/O bilateral hip replacements        FAMILY HISTORY  No pertinent family history in first degree relatives        SOCIAL HISTORY  Social History:  Denies tobacco and illicit drug use.   Drinks alcohol socially. (02 Dec 2020 05:51)        ROS  ***    VITALS:  T(F): 101.1, Max: 101.1 (20 @ 04:17)  HR: 88  BP: 162/88  RR: 16Vital Signs Last 24 Hrs  T(C): 38.4 (03 Dec 2020 04:17), Max: 38.4 (03 Dec 2020 04:17)  T(F): 101.1 (03 Dec 2020 04:17), Max: 101.1 (03 Dec 2020 04:17)  HR: 88 (03 Dec 2020 04:17) (69 - 90)  BP: 162/88 (03 Dec 2020 04:17) (134/96 - 179/91)  BP(mean): --  RR: 16 (03 Dec 2020 04:17) (16 - 18)  SpO2: 91% (03 Dec 2020 04:17) (91% - 91%)    PHYSICAL EXAM:  ***    TESTS & MEASUREMENTS:                        15.8   4.69  )-----------( 131      ( 02 Dec 2020 12:35 )             49.5         136  |  103  |  25<H>  ----------------------------<  127<H>  4.8   |  22  |  1.7<H>    Ca    8.3<L>      02 Dec 2020 12:35  Mg     2.1         TPro  6.8  /  Alb  3.6  /  TBili  1.0  /  DBili  x   /  AST  44<H>  /  ALT  35  /  AlkPhos  90      eGFR if Non African American: 36 mL/min/1.73M2 (20 @ 12:35)  eGFR if African American: 42 mL/min/1.73M2 (20 @ 12:35)    LIVER FUNCTIONS - ( 02 Dec 2020 12:35 )  Alb: 3.6 g/dL / Pro: 6.8 g/dL / ALK PHOS: 90 U/L / ALT: 35 U/L / AST: 44 U/L / GGT: x           Urinalysis Basic - ( 02 Dec 2020 04:15 )    Color: Yellow / Appearance: Clear / S.027 / pH: x  Gluc: x / Ketone: Negative  / Bili: Negative / Urobili: <2 mg/dL   Blood: x / Protein: 100 mg/dL / Nitrite: Negative   Leuk Esterase: Negative / RBC: 3 /HPF / WBC 3 /HPF   Sq Epi: x / Non Sq Epi: 1 /HPF / Bacteria: Negative        Culture - Urine (collected 20 @ 08:40)  Source: .Urine Clean Catch (Midstream)  Final Report (20 @ 18:21):    >100,000 CFU/ml Enterobacter aerogenes  Organism: Enterobacter aerogenes (20 @ 18:21)  Organism: Enterobacter aerogenes (20 @ 18:21)      -  Amikacin: S <=16      -  Ampicillin: R 16 These ampicillin results predict results for amoxicillin      -  Ampicillin/Sulbactam: R <=8/4 Enterobacter, Citrobacter, and Serratia may develop resistance during prolonged therapy (3-4 days)      -  Aztreonam: S <=4      -  Cefazolin: S 16      -  Cefepime: S <=4      -  Cefoxitin: R >16      -  Ceftriaxone: S <=1 Enterobacter, Citrobacter, and Serratia may develop resistance during prolonged therapy      -  Ciprofloxacin: S <=1      -  Ertapenem: S <=1      -  Gentamicin: S <=4      -  Imipenem: S <=1      -  Levofloxacin: S <=2      -  Meropenem: S <=1      -  Nitrofurantoin: I 64 Should not be used to treat pyelonephritis      -  Piperacillin/Tazobactam: S <=16      -  Tigecycline: S <=2      -  Tobramycin: S <=4      -  Trimethoprim/Sulfamethoxazole: S <=2/38      Method Type: KAMI        Lactate, Blood: 1.8 mmol/L (20 @ 00:35)      INFECTIOUS DISEASES TESTING  Procalcitonin, Serum: 0.17 ng/mL (20 @ 12:35)  COVID-19 PCR: Detected (20 @ 04:15)      INFLAMMATORY MARKERS  C-Reactive Protein, Serum: 2.44 mg/dL (20 @ 12:35)      RADIOLOGY & ADDITIONAL TESTS:  I have personally reviewed the last Chest xray  CXR  Xray Chest 1 View AP/PA:   EXAM:  XR CHEST 1 VIEW            PROCEDURE DATE:  2020            INTERPRETATION:  Clinical History / Reason for exam: Trauma.    Comparison : Chest radiograph 2020.    Technique/Positioning: Frontal portable, low lung volumes.    Findings:    Support devices: Telemetry leads are seen.    Cardiac/mediastinum/hilum: Unremarkable.    Lung parenchyma/Pleura: Within normal limits.    Skeleton/soft tissues: Degenerative changes of the shoulders.    Impression:    No radiographic evidence of acute cardiopulmonary disease.    Without difference.              ANNA SANCHEZ MD; Attending Interventional Radiologist  This document has been electronically signed. Dec  2 2020  9:05AM (20 @ 01:13)      CT  CT Abdomen and Pelvis No Cont:   EXAM:  CT ABDOMEN AND PELVIS        EXAM:  CT CHEST            PROCEDURE DATE:  2020            INTERPRETATION:  CLINICAL HISTORY / REASON FOR EXAM: Pain status post trauma    TECHNIQUE: Contiguous axial CT images were obtained from the thoracic inlet to the pubic symphysis without intravenous contrast. Oral contrast was not administered. Reformatted images in the coronal and sagittal planes were acquired.    COMPARISON CT: CT abdomen pelvis from 2017, CT chest dated 10/3/2007.    OTHER STUDIES USED FOR CORRELATION: None.      FINDINGS:    CHEST:    TUBES/LINES: None.    LUNGS, PLEURA, AND AIRWAYS: Subsegmental atelectasis is present bilaterally. Few scattered groundglass densities are seen. No pneumothorax. No consolidation or pleural effusion. Central airways patent.    MEDIASTINUM/THORACIC NODES: No mediastinal or axillary lymphadenopathy.    HEART/GREAT VESSELS: No pericardial effusion. Heart size unremarkable. Coronary artery and thoracic aorta atherosclerotic calcifications. Aneurysmal dilatation of the thoracic aorta, measuring up to 4.5 cm. Aberrant right subclavian artery normal variant.      ABDOMEN/PELVIS:    HEPATOBILIARY: The unenhanced hepatic parenchyma is unremarkable. Cholelithiasis is noted.    SPLEEN: Unremarkable.    PANCREAS: Unremarkable.    ADRENAL GLANDS: Unremarkable.    KIDNEYS: No hydronephrosis or obstructing renal calculus. Bilateral renal cysts, some demonstrating peripheral calcification of the left.    ABDOMINOPELVIC NODES: No abdominopelvic lymphadenopathy.    PELVIC ORGANS: Bladder obscured by streak artifact from the bilateral hip prostheses. Penile prosthesis.    PERITONEUM/MESENTERY/BOWEL: No bowel obstruction, ascites or intraperitoneal free air. Normal caliber appendix.    BONES/SOFT TISSUES: Bilateral total hip arthroplasties. No evidence of acute osseous abnormality. Fat-containing right inguinal hernia. Bilateral L5 pars defects.    VASCULAR: Calcified atherosclerotic disease of the abdominal aorta.      IMPRESSION:    No noncontrast CT evidence of acute intrathoracic or abdominopelvic pathology.    4.5 cm ascending thoracic aortic aneurysm.    Additional incidental findings as above.                    RAMU LEMUS M.D., RESIDENT RADIOLOGIST  This document has been electronically signed.  SD SINGH MD; Attending Radiologist  This document has been electronically signed. Dec  2 2020  3:22AM (20 @ 02:21)      CARDIOLOGY TESTING  12 Lead ECG:   Ventricular Rate 86 BPM    Atrial Rate 86 BPM    P-R Interval 154 ms    QRS Duration 78 ms    Q-T Interval 372 ms    QTC Calculation(Bazett) 445 ms    P Axis 60 degrees    R Axis 145 degrees    T Axis 26 degrees    Diagnosis Line Sinus rhythm with occasional Premature ventricular complexes  Right axis deviation  Abnormal ECG    Confirmed by Gonsalo Garcia ([id number]) on 2020 7:17:35 AM (20 @ 00:49)      MEDICATIONS  amLODIPine   Tablet 10 Oral daily  aspirin  chewable 81 Oral daily  chlorhexidine 4% Liquid 1 Topical <User Schedule>  folic acid 1 Oral daily  furosemide    Tablet 20 Oral daily  heparin   Injectable 5000 SubCutaneous every 8 hours  levothyroxine 25 Oral daily  pantoprazole    Tablet 40 Oral before breakfast  simvastatin 40 Oral at bedtime      Weight  Weight (kg): 89.6 (20 @ 21:44)    ANTIBIOTICS:      ALLERGIES:  OxyContin (Vomiting)       AVA FERNANDEZ  85y, Male  Allergy: OxyContin (Vomiting)      CHIEF COMPLAINT:   Syncope (02 Dec 2020 14:21)      LOS  1d    HPI  HPI:  84 yo M with PMHx of HTN, CAD s/p stents x2, HLD, CHF, hypothyroidism, ?Polio and recent COVID19 diagnosis presents to the ED s/p unwitnessed fall/syncope. Pt states that he went to sleep around 10:00pm and then he woke up on the floor around 11:00pm. He had soiled himself. Pt states that he fell from bed to floor and a dresser also fell on him. He called his daughter and then she called an ambulance for him. He also reports that certain family members tested positive for covid19 so he got himself tested and came back positive about 3 days ago. He only has dry cough. Denies HA, fatigue, fever, chills, dizziness, change in vision, runny nose, sore throat, CP, SOB, wheezing, abd pain, nausea, vomiting, diarrhea, constipation, blood in stool or urine, and dysuria.     ED Vitals: /93 HR 89 RR 18 T99.4f SPO2 96% on RA  Pan CT scan negative for acute trauma  CT cervical spine: severe stenosis @ multilevel  CTAP/CTC: 4.5cm Thoracic Aortic Aneurysm  Received [id number] and Tylenol  (02 Dec 2020 05:51)      INFECTIOUS DISEASE HISTORY:  SARS-CoV-2 PCR POSITIVE   denies productive cough     PMH  PAST MEDICAL & SURGICAL HISTORY:  HLD (hyperlipidemia)    CAD (coronary artery disease)    Polio    CHF (congestive heart failure)    Hypothyroid    History of surgery  cardiac stents    History of bilateral knee replacement    H/O bilateral hip replacements        FAMILY HISTORY  No pertinent family history in first degree relatives        SOCIAL HISTORY  Social History:  Denies tobacco and illicit drug use.   Drinks alcohol socially. (02 Dec 2020 05:51)        ROS  General: Denies rigors, nightsweats  HEENT: Denies headache, rhinorrhea, sore throat, eye pain  CV: Denies CP, palpitations  PULM: Denies wheezing, hemoptysis  GI: Denies hematemesis, hematochezia, melena  : Denies discharge, hematuria  MSK: Denies arthralgias, myalgias  SKIN: Denies rash, lesions  NEURO: Denies paresthesias, weakness  PSYCH: Denies depression, anxiety     VITALS:  T(F): 101.1, Max: 101.1 (20 @ 04:17)  HR: 88  BP: 162/88  RR: 16Vital Signs Last 24 Hrs  T(C): 38.4 (03 Dec 2020 04:17), Max: 38.4 (03 Dec 2020 04:17)  T(F): 101.1 (03 Dec 2020 04:17), Max: 101.1 (03 Dec 2020 04:17)  HR: 88 (03 Dec 2020 04:17) (69 - 90)  BP: 162/88 (03 Dec 2020 04:17) (134/96 - 179/91)  BP(mean): --  RR: 16 (03 Dec 2020 04:17) (16 - 18)  SpO2: 91% (03 Dec 2020 04:17) (91% - 91%)    PHYSICAL EXAM:  Gen: on NC  HEENT: NCAT  Resp: b/l chest expansion  Neuro: nonfocal     TESTS & MEASUREMENTS:                        15.8   4.69  )-----------( 131      ( 02 Dec 2020 12:35 )             49.5         136  |  103  |  25<H>  ----------------------------<  127<H>  4.8   |  22  |  1.7<H>    Ca    8.3<L>      02 Dec 2020 12:35  Mg     2.1         TPro  6.8  /  Alb  3.6  /  TBili  1.0  /  DBili  x   /  AST  44<H>  /  ALT  35  /  AlkPhos  90      eGFR if Non African American: 36 mL/min/1.73M2 (20 @ 12:35)  eGFR if African American: 42 mL/min/1.73M2 (20 @ 12:35)    LIVER FUNCTIONS - ( 02 Dec 2020 12:35 )  Alb: 3.6 g/dL / Pro: 6.8 g/dL / ALK PHOS: 90 U/L / ALT: 35 U/L / AST: 44 U/L / GGT: x           Urinalysis Basic - ( 02 Dec 2020 04:15 )    Color: Yellow / Appearance: Clear / S.027 / pH: x  Gluc: x / Ketone: Negative  / Bili: Negative / Urobili: <2 mg/dL   Blood: x / Protein: 100 mg/dL / Nitrite: Negative   Leuk Esterase: Negative / RBC: 3 /HPF / WBC 3 /HPF   Sq Epi: x / Non Sq Epi: 1 /HPF / Bacteria: Negative        Culture - Urine (collected 20 @ 08:40)  Source: .Urine Clean Catch (Midstream)  Final Report (20 @ 18:21):    >100,000 CFU/ml Enterobacter aerogenes  Organism: Enterobacter aerogenes (20 @ 18:21)  Organism: Enterobacter aerogenes (20 @ 18:21)      -  Amikacin: S <=16      -  Ampicillin: R 16 These ampicillin results predict results for amoxicillin      -  Ampicillin/Sulbactam: R <=8/4 Enterobacter, Citrobacter, and Serratia may develop resistance during prolonged therapy (3-4 days)      -  Aztreonam: S <=4      -  Cefazolin: S 16      -  Cefepime: S <=4      -  Cefoxitin: R >16      -  Ceftriaxone: S <=1 Enterobacter, Citrobacter, and Serratia may develop resistance during prolonged therapy      -  Ciprofloxacin: S <=1      -  Ertapenem: S <=1      -  Gentamicin: S <=4      -  Imipenem: S <=1      -  Levofloxacin: S <=2      -  Meropenem: S <=1      -  Nitrofurantoin: I 64 Should not be used to treat pyelonephritis      -  Piperacillin/Tazobactam: S <=16      -  Tigecycline: S <=2      -  Tobramycin: S <=4      -  Trimethoprim/Sulfamethoxazole: S <=2/38      Method Type: KAMI        Lactate, Blood: 1.8 mmol/L (20 @ 00:35)      INFECTIOUS DISEASES TESTING  Procalcitonin, Serum: 0.17 ng/mL (20 @ 12:35)  COVID-19 PCR: Detected (20 @ 04:15)      INFLAMMATORY MARKERS  C-Reactive Protein, Serum: 2.44 mg/dL (20 @ 12:35)      RADIOLOGY & ADDITIONAL TESTS:  I have personally reviewed the last Chest xray  CXR  Xray Chest 1 View AP/PA:   EXAM:  XR CHEST 1 VIEW            PROCEDURE DATE:  2020            INTERPRETATION:  Clinical History / Reason for exam: Trauma.    Comparison : Chest radiograph 2020.    Technique/Positioning: Frontal portable, low lung volumes.    Findings:    Support devices: Telemetry leads are seen.    Cardiac/mediastinum/hilum: Unremarkable.    Lung parenchyma/Pleura: Within normal limits.    Skeleton/soft tissues: Degenerative changes of the shoulders.    Impression:    No radiographic evidence of acute cardiopulmonary disease.    Without difference.              ANNA SANCHEZ MD; Attending Interventional Radiologist  This document has been electronically signed. Dec  2 2020  9:05AM (20 @ 01:13)      CT  CT Abdomen and Pelvis No Cont:   EXAM:  CT ABDOMEN AND PELVIS        EXAM:  CT CHEST            PROCEDURE DATE:  2020            INTERPRETATION:  CLINICAL HISTORY / REASON FOR EXAM: Pain status post trauma    TECHNIQUE: Contiguous axial CT images were obtained from the thoracic inlet to the pubic symphysis without intravenous contrast. Oral contrast was not administered. Reformatted images in the coronal and sagittal planes were acquired.    COMPARISON CT: CT abdomen pelvis from 2017, CT chest dated 10/3/2007.    OTHER STUDIES USED FOR CORRELATION: None.      FINDINGS:    CHEST:    TUBES/LINES: None.    LUNGS, PLEURA, AND AIRWAYS: Subsegmental atelectasis is present bilaterally. Few scattered groundglass densities are seen. No pneumothorax. No consolidation or pleural effusion. Central airways patent.    MEDIASTINUM/THORACIC NODES: No mediastinal or axillary lymphadenopathy.    HEART/GREAT VESSELS: No pericardial effusion. Heart size unremarkable. Coronary artery and thoracic aorta atherosclerotic calcifications. Aneurysmal dilatation of the thoracic aorta, measuring up to 4.5 cm. Aberrant right subclavian artery normal variant.      ABDOMEN/PELVIS:    HEPATOBILIARY: The unenhanced hepatic parenchyma is unremarkable. Cholelithiasis is noted.    SPLEEN: Unremarkable.    PANCREAS: Unremarkable.    ADRENAL GLANDS: Unremarkable.    KIDNEYS: No hydronephrosis or obstructing renal calculus. Bilateral renal cysts, some demonstrating peripheral calcification of the left.    ABDOMINOPELVIC NODES: No abdominopelvic lymphadenopathy.    PELVIC ORGANS: Bladder obscured by streak artifact from the bilateral hip prostheses. Penile prosthesis.    PERITONEUM/MESENTERY/BOWEL: No bowel obstruction, ascites or intraperitoneal free air. Normal caliber appendix.    BONES/SOFT TISSUES: Bilateral total hip arthroplasties. No evidence of acute osseous abnormality. Fat-containing right inguinal hernia. Bilateral L5 pars defects.    VASCULAR: Calcified atherosclerotic disease of the abdominal aorta.      IMPRESSION:    No noncontrast CT evidence of acute intrathoracic or abdominopelvic pathology.    4.5 cm ascending thoracic aortic aneurysm.    Additional incidental findings as above.                    RAMU LEMUS M.D., RESIDENT RADIOLOGIST  This document has been electronically signed.  SD SINGH MD; Attending Radiologist  This document has been electronically signed. Dec  2 2020  3:22AM (20 @ 02:21)      CARDIOLOGY TESTING  12 Lead ECG:   Ventricular Rate 86 BPM    Atrial Rate 86 BPM    P-R Interval 154 ms    QRS Duration 78 ms    Q-T Interval 372 ms    QTC Calculation(Bazett) 445 ms    P Axis 60 degrees    R Axis 145 degrees    T Axis 26 degrees    Diagnosis Line Sinus rhythm with occasional Premature ventricular complexes  Right axis deviation  Abnormal ECG    Confirmed by Gonsalo Garcia ([id number]) on 2020 7:17:35 AM (20 @ 00:49)      MEDICATIONS  amLODIPine   Tablet 10 Oral daily  aspirin  chewable 81 Oral daily  chlorhexidine 4% Liquid 1 Topical <User Schedule>  folic acid 1 Oral daily  furosemide    Tablet 20 Oral daily  heparin   Injectable 5000 SubCutaneous every 8 hours  levothyroxine 25 Oral daily  pantoprazole    Tablet 40 Oral before breakfast  simvastatin 40 Oral at bedtime      Weight  Weight (kg): 89.6 (20 @ 21:44)    ANTIBIOTICS:      ALLERGIES:  OxyContin (Vomiting)

## 2020-12-04 ENCOUNTER — TRANSCRIPTION ENCOUNTER (OUTPATIENT)
Age: 85
End: 2020-12-04

## 2020-12-04 LAB
ALBUMIN SERPL ELPH-MCNC: 3.4 G/DL — LOW (ref 3.5–5.2)
ALBUMIN SERPL ELPH-MCNC: 3.6 G/DL — SIGNIFICANT CHANGE UP (ref 3.5–5.2)
ALP SERPL-CCNC: 81 U/L — SIGNIFICANT CHANGE UP (ref 30–115)
ALP SERPL-CCNC: 84 U/L — SIGNIFICANT CHANGE UP (ref 30–115)
ALT FLD-CCNC: 25 U/L — SIGNIFICANT CHANGE UP (ref 0–41)
ALT FLD-CCNC: 25 U/L — SIGNIFICANT CHANGE UP (ref 0–41)
ANION GAP SERPL CALC-SCNC: 15 MMOL/L — HIGH (ref 7–14)
AST SERPL-CCNC: 29 U/L — SIGNIFICANT CHANGE UP (ref 0–41)
AST SERPL-CCNC: 30 U/L — SIGNIFICANT CHANGE UP (ref 0–41)
BASOPHILS # BLD AUTO: 0.01 K/UL — SIGNIFICANT CHANGE UP (ref 0–0.2)
BASOPHILS NFR BLD AUTO: 0.2 % — SIGNIFICANT CHANGE UP (ref 0–1)
BILIRUB DIRECT SERPL-MCNC: <0.2 MG/DL — SIGNIFICANT CHANGE UP (ref 0–0.2)
BILIRUB INDIRECT FLD-MCNC: >0.2 MG/DL — SIGNIFICANT CHANGE UP (ref 0.2–1.2)
BILIRUB SERPL-MCNC: 0.4 MG/DL — SIGNIFICANT CHANGE UP (ref 0.2–1.2)
BILIRUB SERPL-MCNC: 0.4 MG/DL — SIGNIFICANT CHANGE UP (ref 0.2–1.2)
BUN SERPL-MCNC: 34 MG/DL — HIGH (ref 10–20)
CALCIUM SERPL-MCNC: 8.4 MG/DL — LOW (ref 8.5–10.1)
CHLORIDE SERPL-SCNC: 102 MMOL/L — SIGNIFICANT CHANGE UP (ref 98–110)
CO2 SERPL-SCNC: 20 MMOL/L — SIGNIFICANT CHANGE UP (ref 17–32)
CREAT SERPL-MCNC: 1.6 MG/DL — HIGH (ref 0.7–1.5)
CREAT SERPL-MCNC: 1.7 MG/DL — HIGH (ref 0.7–1.5)
CRP SERPL-MCNC: 7.09 MG/DL — HIGH (ref 0–0.4)
D DIMER BLD IA.RAPID-MCNC: 425 NG/ML DDU — HIGH (ref 0–230)
EOSINOPHIL # BLD AUTO: 0 K/UL — SIGNIFICANT CHANGE UP (ref 0–0.7)
EOSINOPHIL NFR BLD AUTO: 0 % — SIGNIFICANT CHANGE UP (ref 0–8)
FERRITIN SERPL-MCNC: 406 NG/ML — HIGH (ref 30–400)
GLUCOSE SERPL-MCNC: 128 MG/DL — HIGH (ref 70–99)
HCT VFR BLD CALC: 53.7 % — HIGH (ref 42–52)
HGB BLD-MCNC: 17.4 G/DL — SIGNIFICANT CHANGE UP (ref 14–18)
IMM GRANULOCYTES NFR BLD AUTO: 0.2 % — SIGNIFICANT CHANGE UP (ref 0.1–0.3)
LYMPHOCYTES # BLD AUTO: 0.76 K/UL — LOW (ref 1.2–3.4)
LYMPHOCYTES # BLD AUTO: 13.1 % — LOW (ref 20.5–51.1)
MAGNESIUM SERPL-MCNC: 2.1 MG/DL — SIGNIFICANT CHANGE UP (ref 1.8–2.4)
MCHC RBC-ENTMCNC: 25.9 PG — LOW (ref 27–31)
MCHC RBC-ENTMCNC: 32.4 G/DL — SIGNIFICANT CHANGE UP (ref 32–37)
MCV RBC AUTO: 80 FL — SIGNIFICANT CHANGE UP (ref 80–94)
MONOCYTES # BLD AUTO: 0.59 K/UL — SIGNIFICANT CHANGE UP (ref 0.1–0.6)
MONOCYTES NFR BLD AUTO: 10.2 % — HIGH (ref 1.7–9.3)
MRSA PCR RESULT.: NEGATIVE — SIGNIFICANT CHANGE UP
NEUTROPHILS # BLD AUTO: 4.41 K/UL — SIGNIFICANT CHANGE UP (ref 1.4–6.5)
NEUTROPHILS NFR BLD AUTO: 76.3 % — HIGH (ref 42.2–75.2)
NRBC # BLD: 0 /100 WBCS — SIGNIFICANT CHANGE UP (ref 0–0)
PLATELET # BLD AUTO: 125 K/UL — LOW (ref 130–400)
POTASSIUM SERPL-MCNC: 4.5 MMOL/L — SIGNIFICANT CHANGE UP (ref 3.5–5)
POTASSIUM SERPL-SCNC: 4.5 MMOL/L — SIGNIFICANT CHANGE UP (ref 3.5–5)
PROCALCITONIN SERPL-MCNC: 0.2 NG/ML — HIGH (ref 0.02–0.1)
PROT SERPL-MCNC: 6.7 G/DL — SIGNIFICANT CHANGE UP (ref 6–8)
PROT SERPL-MCNC: 6.8 G/DL — SIGNIFICANT CHANGE UP (ref 6–8)
RBC # BLD: 6.71 M/UL — HIGH (ref 4.7–6.1)
RBC # FLD: 14.8 % — HIGH (ref 11.5–14.5)
SODIUM SERPL-SCNC: 137 MMOL/L — SIGNIFICANT CHANGE UP (ref 135–146)
WBC # BLD: 5.78 K/UL — SIGNIFICANT CHANGE UP (ref 4.8–10.8)
WBC # FLD AUTO: 5.78 K/UL — SIGNIFICANT CHANGE UP (ref 4.8–10.8)

## 2020-12-04 PROCEDURE — 99233 SBSQ HOSP IP/OBS HIGH 50: CPT

## 2020-12-04 PROCEDURE — 71046 X-RAY EXAM CHEST 2 VIEWS: CPT | Mod: 26

## 2020-12-04 RX ADMIN — Medication 25 MICROGRAM(S): at 05:46

## 2020-12-04 RX ADMIN — REMDESIVIR 500 MILLIGRAM(S): 5 INJECTION INTRAVENOUS at 16:00

## 2020-12-04 RX ADMIN — SIMVASTATIN 40 MILLIGRAM(S): 20 TABLET, FILM COATED ORAL at 21:19

## 2020-12-04 RX ADMIN — AMLODIPINE BESYLATE 10 MILLIGRAM(S): 2.5 TABLET ORAL at 05:45

## 2020-12-04 RX ADMIN — Medication 81 MILLIGRAM(S): at 12:03

## 2020-12-04 RX ADMIN — Medication 1 MILLIGRAM(S): at 12:03

## 2020-12-04 RX ADMIN — Medication 20 MILLIGRAM(S): at 05:45

## 2020-12-04 RX ADMIN — PANTOPRAZOLE SODIUM 40 MILLIGRAM(S): 20 TABLET, DELAYED RELEASE ORAL at 06:20

## 2020-12-04 RX ADMIN — TIOTROPIUM BROMIDE 1 CAPSULE(S): 18 CAPSULE ORAL; RESPIRATORY (INHALATION) at 12:03

## 2020-12-04 RX ADMIN — Medication 6 MILLIGRAM(S): at 05:46

## 2020-12-04 RX ADMIN — CHLORHEXIDINE GLUCONATE 1 APPLICATION(S): 213 SOLUTION TOPICAL at 05:46

## 2020-12-04 NOTE — DISCHARGE NOTE PROVIDER - NSDCCPCAREPLAN_GEN_ALL_CORE_FT
PRINCIPAL DISCHARGE DIAGNOSIS  Diagnosis: COVID-19  Assessment and Plan of Treatment: You have tested POSITIVE for the novel coronavirus (COVID-19). Upon discharge, you must self-quarantine for 14 days, or until the Department of Health contacts you. Please wear a face mask if you are around other individuals. Try to avoid contact with house members, family, and friends for the duration of this quarantine. Please follow up with your primary care physician within 2-3 weeks of your discharge from the hospital. Please take all medications as prescribed. If you experience any worsening or recurrence of your symptoms, particularly worsening or high fever, shortness of breathe, extreme fatigue, or bloody cough please call [phone] immediately or report to the nearest Emergency Department.      SECONDARY DISCHARGE DIAGNOSES  Diagnosis: Fall, initial encounter  Assessment and Plan of Treatment:     Diagnosis: COVID-19  Assessment and Plan of Treatment:

## 2020-12-04 NOTE — PROGRESS NOTE ADULT - SUBJECTIVE AND OBJECTIVE BOX
LENGTH OF HOSPITAL STAY: 2d      CHIEF COMPLAINT:   Patient is a 85y old  Male who presents with a chief complaint of Syncope (03 Dec 2020 13:11)      OVER Past 24hrs:  The patient was seen and examined at bedside there were    HISTORY OF PRESENTING ILLNESS:    HPI:  84 yo M with PMHx of HTN, CAD s/p stents x2, HLD, CHF, hypothyroidism, ?Polio and recent COVID19 diagnosis presents to the ED s/p unwitnessed fall/syncope. Pt states that he went to sleep around 10:00pm and then he woke up on the floor around 11:00pm. He had soiled himself. Pt states that he fell from bed to floor and a dresser also fell on him. He called his daughter and then she called an ambulance for him. He also reports that certain family members tested positive for covid19 so he got himself tested and came back positive about 3 days ago. He only has dry cough. Denies HA, fatigue, fever, chills, dizziness, change in vision, runny nose, sore throat, CP, SOB, wheezing, abd pain, nausea, vomiting, diarrhea, constipation, blood in stool or urine, and dysuria.     ED Vitals: /93 HR 89 RR 18 T99.4f SPO2 96% on RA  Pan CT scan negative for acute trauma  CT cervical spine: severe stenosis @ multilevel  CTAP/CTC: 4.5cm Thoracic Aortic Aneurysm  Received [id number] and Tylenol  (02 Dec 2020 05:51)    PAST MEDICAL & SURGICAL HISTORY  PAST MEDICAL & SURGICAL HISTORY:  HLD (hyperlipidemia)    CAD (coronary artery disease)    Polio    CHF (congestive heart failure)    Hypothyroid    History of surgery  cardiac stents    History of bilateral knee replacement    H/O bilateral hip replacements          REVIEW OF SYSTEMS  CONSTITUTIONAL: No fever, weight loss, or fatigue  EYES: No eye pain, visual disturbances, or discharge  ENMT:  No difficulty hearing, tinnitus, vertigo; No sinus or throat pain  NECK: No pain or stiffness  RESPIRATORY: No cough, wheezing, chills or hemoptysis; No shortness of breath  CARDIOVASCULAR: No chest pain, palpitations, dizziness, or leg swelling  GASTROINTESTINAL: No abdominal or epigastric pain. No nausea, vomiting, or hematemesis; No diarrhea or constipation. No melena or hematochezia.  GENITOURINARY: No dysuria, frequency, hematuria, or incontinence  NEUROLOGICAL: No headaches, memory loss, loss of strength, numbness, or tremors  SKIN: No itching, burning, rashes, or lesions   LYMPH NODES: No enlarged glands  ENDOCRINE: No heat or cold intolerance; No hair loss  MUSCULOSKELETAL: No joint pain or swelling; No muscle, back, or extremity pain  PSYCHIATRIC: No depression, anxiety, mood swings, or difficulty sleeping  HEME/LYMPH: No easy bruising, or bleeding gums  ALLERY AND IMMUNOLOGIC: No hives or eczema    ALLERGIES:  OxyContin (Vomiting)    MEDICATIONS:  STANDING MEDICATIONS  amLODIPine   Tablet 10 milliGRAM(s) Oral daily  aspirin  chewable 81 milliGRAM(s) Oral daily  chlorhexidine 4% Liquid 1 Application(s) Topical <User Schedule>  dexAMETHasone  Injectable 6 milliGRAM(s) IV Push daily  folic acid 1 milliGRAM(s) Oral daily  furosemide    Tablet 20 milliGRAM(s) Oral daily  heparin   Injectable 5000 Unit(s) SubCutaneous every 8 hours  levothyroxine 25 MICROGram(s) Oral daily  pantoprazole    Tablet 40 milliGRAM(s) Oral before breakfast  remdesivir  IVPB   IV Intermittent   remdesivir  IVPB 100 milliGRAM(s) IV Intermittent every 24 hours  simvastatin 40 milliGRAM(s) Oral at bedtime  tiotropium 18 MICROgram(s) Capsule 1 Capsule(s) Inhalation daily    PRN MEDICATIONS    VITALS:   T(F): 96.5  HR: 85  BP: 123/86  RR: 18  SpO2: 95%    PHYSICAL EXAM:  General: No acute distress.  Alert, oriented, interactive, nonfocal.    HEENT: Pupils equal, reactive to light symmetrically.    PULM: Clear to auscultation bilaterally, no significant sputum production.    CVS: Regular rate and rhythm, no murmurs, rubs, or gallops.    Abdomen: Soft, nondistended, nontender.    Pelvis:    Extremities: No edema, nontender.    SKIN: Warm and well perfused, no rashes noted.    PSYCH:     NEURO:    LABS:                        17.4   5.78  )-----------( 125      ( 04 Dec 2020 04:30 )             53.7     12-04    x   |  x   |  x   ----------------------------<  x   x    |  x   |  1.6<H>    Ca    8.4<L>      04 Dec 2020 04:30  Mg     2.1     12-04    TPro  6.7  /  Alb  3.6  /  TBili  0.4  /  DBili  <0.2  /  AST  30  /  ALT  25  /  AlkPhos  81  12-04              Culture - Urine (collected 02 Dec 2020 04:15)  Source: .Urine Clean Catch (Midstream)  Final Report (03 Dec 2020 11:56):    <10,000 CFU/mL Normal Urogenital Temi      CARDIAC MARKERS ( 02 Dec 2020 17:00 )  x     / <0.01 ng/mL / x     / x     / 3.9 ng/mL          Assessment/Plan:  84 yo M with PMHx of HTN, CAD s/p stents x2, HLD, CHF, hypothyroidism, ?Polio and recent COVID19 diagnosis presents to the ED s/p unwitnessed fall/syncope. Pt states that he went to sleep around 10:00pm and then he woke up on the floor around 11:00pm. He had soiled himself. Pt states that he fell from bed to floor and a dresser also fell on him. He called his daughter and then she called an ambulance for him. He also reports that certain family members tested positive for covid19 so he got himself tested and came back positive about 3 days ago. He only has dry cough. Denies HA, fatigue, fever, chills, dizziness, change in vision, runny nose, sore throat, CP, SOB, wheezing, abd pain, nausea, vomiting, diarrhea, constipation, blood in stool or urine, and dysuria.     ED Vitals: /93 HR 89 RR 18 T99.4f SPO2 96% on RA  Pan CT scan negative for acute trauma  CT cervical spine: severe stenosis @ multilevel  CTAP/CTC: 4.5cm Thoracic Aortic Aneurysm  Received [id number] and Tylenol     # Unwitnessed fall/Syncope, r/o seizure   - Pan CT scan negative for acute trauma  - EEG: WNL (no need for MRI)   - Carotid Duplex: Mild 20-39% stenosis bilaterally    - EKG: NSR    # Dry Cough Most Likely 2ry to COVID19 PNA  - CXR (PA/LA): No focal consolidation.  - fu Inflam markers  - Decadron: Day 2/10 (Started on 12/03)  - starting RDV as per ID as CrCl improved.  - RVD: Day 2/5 (Started on 12/03)  - x2U Plasma on 12/04.  - Cleared by ID for dc (As per Dr. Jack; over Teams)  - Incentive Spirometer and Proning PRN.     # LOLI on CKD III  - Baseline creatinine 1.7--- stable   - Avoid nephrotoxic agents  - Gentle hydration     #suspected HFpEF- stable- unable to get echo    # CAD s/p stents x2// HLD  - Cont ASA, Statin, not on bb? may benefit w/ low dose bb     # HTN - elevated, hold ACEI, c/w ccb  # Ascending Thoracic Aortic Aneurysm on CT scan - o/p Vascular f/u  # Severe Cervical spinal stenosis - pt asymptomatic, consult neurosx      # DVT ppx - heparin sq  # GI ppx - PPI  # Diet - DASH  # Full code                        LENGTH OF HOSPITAL STAY: 2d      CHIEF COMPLAINT:   Patient is a 85y old  Male who presents with a chief complaint of Syncope (03 Dec 2020 13:11)      OVER Past 24hrs:  The patient was seen and examined at bedside there were    HISTORY OF PRESENTING ILLNESS:    HPI:  86 yo M with PMHx of HTN, CAD s/p stents x2, HLD, CHF, hypothyroidism, ?Polio and recent COVID19 diagnosis presents to the ED s/p unwitnessed fall/syncope. Pt states that he went to sleep around 10:00pm and then he woke up on the floor around 11:00pm. He had soiled himself. Pt states that he fell from bed to floor and a dresser also fell on him. He called his daughter and then she called an ambulance for him. He also reports that certain family members tested positive for covid19 so he got himself tested and came back positive about 3 days ago. He only has dry cough. Denies HA, fatigue, fever, chills, dizziness, change in vision, runny nose, sore throat, CP, SOB, wheezing, abd pain, nausea, vomiting, diarrhea, constipation, blood in stool or urine, and dysuria.     ED Vitals: /93 HR 89 RR 18 T99.4f SPO2 96% on RA  Pan CT scan negative for acute trauma  CT cervical spine: severe stenosis @ multilevel  CTAP/CTC: 4.5cm Thoracic Aortic Aneurysm  Received [id number] and Tylenol  (02 Dec 2020 05:51)    Attending Note: Pt seen and examined at bedside. No cp or sob.       PAST MEDICAL & SURGICAL HISTORY  PAST MEDICAL & SURGICAL HISTORY:  HLD (hyperlipidemia)    CAD (coronary artery disease)    Polio    CHF (congestive heart failure)    Hypothyroid    History of surgery  cardiac stents    History of bilateral knee replacement    H/O bilateral hip replacements          REVIEW OF SYSTEMS  CONSTITUTIONAL: No fever, weight loss, or fatigue  EYES: No eye pain, visual disturbances, or discharge  ENMT:  No difficulty hearing, tinnitus, vertigo; No sinus or throat pain  NECK: No pain or stiffness  RESPIRATORY: No cough, wheezing, chills or hemoptysis; No shortness of breath  CARDIOVASCULAR: No chest pain, palpitations, dizziness, or leg swelling  GASTROINTESTINAL: No abdominal or epigastric pain. No nausea, vomiting, or hematemesis; No diarrhea or constipation. No melena or hematochezia.  GENITOURINARY: No dysuria, frequency, hematuria, or incontinence  NEUROLOGICAL: No headaches, memory loss, loss of strength, numbness, or tremors  SKIN: No itching, burning, rashes, or lesions   LYMPH NODES: No enlarged glands  ENDOCRINE: No heat or cold intolerance; No hair loss  MUSCULOSKELETAL: No joint pain or swelling; No muscle, back, or extremity pain  PSYCHIATRIC: No depression, anxiety, mood swings, or difficulty sleeping  HEME/LYMPH: No easy bruising, or bleeding gums  ALLERY AND IMMUNOLOGIC: No hives or eczema    ALLERGIES:  OxyContin (Vomiting)    MEDICATIONS:  STANDING MEDICATIONS  amLODIPine   Tablet 10 milliGRAM(s) Oral daily  aspirin  chewable 81 milliGRAM(s) Oral daily  chlorhexidine 4% Liquid 1 Application(s) Topical <User Schedule>  dexAMETHasone  Injectable 6 milliGRAM(s) IV Push daily  folic acid 1 milliGRAM(s) Oral daily  furosemide    Tablet 20 milliGRAM(s) Oral daily  heparin   Injectable 5000 Unit(s) SubCutaneous every 8 hours  levothyroxine 25 MICROGram(s) Oral daily  pantoprazole    Tablet 40 milliGRAM(s) Oral before breakfast  remdesivir  IVPB   IV Intermittent   remdesivir  IVPB 100 milliGRAM(s) IV Intermittent every 24 hours  simvastatin 40 milliGRAM(s) Oral at bedtime  tiotropium 18 MICROgram(s) Capsule 1 Capsule(s) Inhalation daily    PRN MEDICATIONS    VITALS:   T(F): 96.5  HR: 85  BP: 123/86  RR: 18  SpO2: 95%    PHYSICAL EXAM:  General: No acute distress.  Alert, oriented, interactive, nonfocal.    HEENT: Pupils equal, reactive to light symmetrically.    PULM: Clear to auscultation bilaterally, no significant sputum production.    CVS: Regular rate and rhythm, no murmurs, rubs, or gallops.    Abdomen: Soft, nondistended, nontender.    Pelvis:    Extremities: No edema, nontender.    SKIN: Warm and well perfused, no rashes noted.    PSYCH:     NEURO:    LABS:                        17.4   5.78  )-----------( 125      ( 04 Dec 2020 04:30 )             53.7     12-04    x   |  x   |  x   ----------------------------<  x   x    |  x   |  1.6<H>    Ca    8.4<L>      04 Dec 2020 04:30  Mg     2.1     12-04    TPro  6.7  /  Alb  3.6  /  TBili  0.4  /  DBili  <0.2  /  AST  30  /  ALT  25  /  AlkPhos  81  12-04              Culture - Urine (collected 02 Dec 2020 04:15)  Source: .Urine Clean Catch (Midstream)  Final Report (03 Dec 2020 11:56):    <10,000 CFU/mL Normal Urogenital Temi      CARDIAC MARKERS ( 02 Dec 2020 17:00 )  x     / <0.01 ng/mL / x     / x     / 3.9 ng/mL          Assessment/Plan:  86 yo M with PMHx of HTN, CAD s/p stents x2, HLD, CHF, hypothyroidism, ?Polio and recent COVID19 diagnosis presents to the ED s/p unwitnessed fall/syncope. Pt states that he went to sleep around 10:00pm and then he woke up on the floor around 11:00pm. He had soiled himself. Pt states that he fell from bed to floor and a dresser also fell on him. He called his daughter and then she called an ambulance for him. He also reports that certain family members tested positive for covid19 so he got himself tested and came back positive about 3 days ago. He only has dry cough. Denies HA, fatigue, fever, chills, dizziness, change in vision, runny nose, sore throat, CP, SOB, wheezing, abd pain, nausea, vomiting, diarrhea, constipation, blood in stool or urine, and dysuria.     ED Vitals: /93 HR 89 RR 18 T99.4f SPO2 96% on RA  Pan CT scan negative for acute trauma  CT cervical spine: severe stenosis @ multilevel  CTAP/CTC: 4.5cm Thoracic Aortic Aneurysm  Received [id number] and Tylenol     # Unwitnessed fall/Syncope, r/o seizure   - Pan CT scan negative for acute trauma  - EEG: WNL (no need for MRI) as per neuro  - Carotid Duplex: Mild 20-39% stenosis bilaterally    - EKG: NSR    # Dry Cough Most Likely 2ry to COVID19 PNA  - CXR (PA/LA): No focal consolidation.  - fu Inflam markers  - Decadron: Day 2/10 (Started on 12/03)  - starting RDV as per ID as CrCl improved.  - RVD: Day 2/5 (Started on 12/03)  - x2U Plasma on 12/04.  - Cleared by ID for dc (As per Dr. Jack; over Teams)  - Incentive Spirometer and Proning PRN.     # LOLI on CKD III  - Baseline creatinine 1.7--- stable   - Avoid nephrotoxic agents  - Gentle hydration     #suspected HFpEF- stable- unable to get echo    # CAD s/p stents x2// HLD  - Cont ASA, Statin, not on bb? may benefit w/ low dose bb     # HTN - elevated, hold ACEI, c/w ccb  # Ascending Thoracic Aortic Aneurysm on CT scan - o/p Vascular f/u  # Severe Cervical spinal stenosis - pt asymptomatic, consult neurosx      # DVT ppx - heparin sq  # GI ppx - PPI  # Diet - DASH  # Full code

## 2020-12-04 NOTE — DISCHARGE NOTE PROVIDER - NSDCMRMEDTOKEN_GEN_ALL_CORE_FT
albuterol 90 mcg/inh inhalation powder: 2 puff(s) inhaled every 6 hours, As Needed   amlodipine-benazepril 5 mg-10 mg oral capsule: 1 cap(s) orally once a day  aspirin 81 mg oral tablet, chewable: 1 tab(s) orally once a day  folic acid: 1 milligram(s) orally once a day  furosemide 20 mg oral tablet: 1 tab(s) orally once a day  simvastatin 40 mg oral tablet: 1 tab(s) orally once a day (at bedtime)  Spiriva 18 mcg inhalation capsule: 1 cap(s) inhaled once a day  Synthroid 25 mcg (0.025 mg) oral tablet: 1 tab(s) orally once a day   albuterol 90 mcg/inh inhalation powder: 2 puff(s) inhaled every 6 hours, As Needed   amlodipine-benazepril 5 mg-10 mg oral capsule: 1 cap(s) orally once a day  aspirin 81 mg oral tablet, chewable: 1 tab(s) orally once a day  dexamethasone 6 mg oral tablet: 1 tab(s) orally once a day   folic acid: 1 milligram(s) orally once a day  furosemide 20 mg oral tablet: 1 tab(s) orally once a day  simvastatin 40 mg oral tablet: 1 tab(s) orally once a day (at bedtime)  Spiriva 18 mcg inhalation capsule: 1 cap(s) inhaled once a day  Synthroid 25 mcg (0.025 mg) oral tablet: 1 tab(s) orally once a day

## 2020-12-04 NOTE — PROGRESS NOTE ADULT - ATTENDING COMMENTS
#Progress Note Handoff  Pending (specify):  pending follow up covid labs, if pt stable bny Am can be go home  Family discussion: dw daughter Marylou and agreed to plan   Disposition: Home__x_/SNF___/Other___/Unknown at this time___  Pt seen during COVID 19 Pandemic.

## 2020-12-04 NOTE — DISCHARGE NOTE PROVIDER - HOSPITAL COURSE
86 yo M with PMHx of HTN, CAD s/p stents x2, HLD, CHF, hypothyroidism, ?Polio and recent COVID19 diagnosis presents to the ED s/p unwitnessed fall/syncope. Pt states that he went to sleep around 10:00pm and then he woke up on the floor around 11:00pm. He had soiled himself. Pt states that he fell from bed to floor and a dresser also fell on him. He called his daughter and then she called an ambulance for him. He also reports that certain family members tested positive for covid19 so he got himself tested and came back positive about 3 days ago. He only has dry cough. Denies HA, fatigue, fever, chills, dizziness, change in vision, runny nose, sore throat, CP, SOB, wheezing, abd pain, nausea, vomiting, diarrhea, constipation, blood in stool or urine, and dysuria.   ED Vitals: /93 HR 89 RR 18 T99.4f SPO2 96% on RA  Pan CT scan negative for acute trauma  CT cervical spine: severe stenosis @ multilevel  CTAP/CTC: 4.5cm Thoracic Aortic Aneurysm  Received [id number] and Tylenol     For the unwitnessed fall, trauma w/u in the ED was negative, EEG was normal,  EKG was NSR and Carotid Duplex: Mild 20-39% stenosis bilaterally.      For his COVID pna, patient received the following:  - RVD  - Decadron  - CXR (PA/LA)(12/04 showed No focal consolidation.  - x2U Plasma on 12/04.         84 yo M with PMHx of HTN, CAD s/p stents x2, HLD, CHF, hypothyroidism, ?Polio and recent COVID19 diagnosis presents to the ED s/p unwitnessed fall/syncope. Pt states that he went to sleep around 10:00pm and then he woke up on the floor around 11:00pm. He had soiled himself. Pt states that he fell from bed to floor and a dresser also fell on him. He called his daughter and then she called an ambulance for him. He also reports that certain family members tested positive for covid19 so he got himself tested and came back positive about 3 days ago. He only has dry cough. Denies HA, fatigue, fever, chills, dizziness, change in vision, runny nose, sore throat, CP, SOB, wheezing, abd pain, nausea, vomiting, diarrhea, constipation, blood in stool or urine, and dysuria.   ED Vitals: /93 HR 89 RR 18 T99.4f SPO2 96% on RA  Pan CT scan negative for acute trauma  CT cervical spine: severe stenosis @ multilevel  CTAP/CTC: 4.5cm Thoracic Aortic Aneurysm  Received [id number] and Tylenol     For the unwitnessed fall, trauma w/u in the ED was negative, EEG was normal,  EKG was NSR and Carotid Duplex: Mild 20-39% stenosis bilaterally.      For his COVID pna, patient received the following:  - RVD  - Decadron  - CXR (PA/LA)(12/04 showed No focal consolidation.  - x2U Plasma on 12/04.    pt on RA completed RVD  ambulated well with walker     discharge home with PO decadron to complete 10 days   keep social distance and mask on   follow up with PMD

## 2020-12-04 NOTE — DISCHARGE NOTE PROVIDER - CARE PROVIDER_API CALL
Nick Anderson  NP IN [facility]  [address] [place] [id number]  Phone: [phone]  Fax: [phone]  Established Patient  Follow Up Time: 1 week

## 2020-12-05 LAB
ANION GAP SERPL CALC-SCNC: 13 MMOL/L — SIGNIFICANT CHANGE UP (ref 7–14)
BUN SERPL-MCNC: 38 MG/DL — HIGH (ref 10–20)
CALCIUM SERPL-MCNC: 8.2 MG/DL — LOW (ref 8.5–10.1)
CHLORIDE SERPL-SCNC: 102 MMOL/L — SIGNIFICANT CHANGE UP (ref 98–110)
CO2 SERPL-SCNC: 22 MMOL/L — SIGNIFICANT CHANGE UP (ref 17–32)
CREAT SERPL-MCNC: 1.6 MG/DL — HIGH (ref 0.7–1.5)
GLUCOSE SERPL-MCNC: 159 MG/DL — HIGH (ref 70–99)
HCT VFR BLD CALC: 50.8 % — SIGNIFICANT CHANGE UP (ref 42–52)
HGB BLD-MCNC: 16.5 G/DL — SIGNIFICANT CHANGE UP (ref 14–18)
MAGNESIUM SERPL-MCNC: 2.1 MG/DL — SIGNIFICANT CHANGE UP (ref 1.8–2.4)
MCHC RBC-ENTMCNC: 25.9 PG — LOW (ref 27–31)
MCHC RBC-ENTMCNC: 32.5 G/DL — SIGNIFICANT CHANGE UP (ref 32–37)
MCV RBC AUTO: 79.7 FL — LOW (ref 80–94)
NRBC # BLD: 0 /100 WBCS — SIGNIFICANT CHANGE UP (ref 0–0)
PLATELET # BLD AUTO: 132 K/UL — SIGNIFICANT CHANGE UP (ref 130–400)
POTASSIUM SERPL-MCNC: 4.7 MMOL/L — SIGNIFICANT CHANGE UP (ref 3.5–5)
POTASSIUM SERPL-SCNC: 4.7 MMOL/L — SIGNIFICANT CHANGE UP (ref 3.5–5)
RBC # BLD: 6.37 M/UL — HIGH (ref 4.7–6.1)
RBC # FLD: 14.3 % — SIGNIFICANT CHANGE UP (ref 11.5–14.5)
SODIUM SERPL-SCNC: 137 MMOL/L — SIGNIFICANT CHANGE UP (ref 135–146)
WBC # BLD: 8.24 K/UL — SIGNIFICANT CHANGE UP (ref 4.8–10.8)
WBC # FLD AUTO: 8.24 K/UL — SIGNIFICANT CHANGE UP (ref 4.8–10.8)

## 2020-12-05 PROCEDURE — 71045 X-RAY EXAM CHEST 1 VIEW: CPT | Mod: 26

## 2020-12-05 PROCEDURE — 99233 SBSQ HOSP IP/OBS HIGH 50: CPT

## 2020-12-05 RX ORDER — ACETAMINOPHEN 500 MG
650 TABLET ORAL EVERY 6 HOURS
Refills: 0 | Status: DISCONTINUED | OUTPATIENT
Start: 2020-12-05 | End: 2020-12-08

## 2020-12-05 RX ADMIN — Medication 25 MICROGRAM(S): at 05:07

## 2020-12-05 RX ADMIN — TIOTROPIUM BROMIDE 1 CAPSULE(S): 18 CAPSULE ORAL; RESPIRATORY (INHALATION) at 17:43

## 2020-12-05 RX ADMIN — SIMVASTATIN 40 MILLIGRAM(S): 20 TABLET, FILM COATED ORAL at 21:33

## 2020-12-05 RX ADMIN — PANTOPRAZOLE SODIUM 40 MILLIGRAM(S): 20 TABLET, DELAYED RELEASE ORAL at 05:08

## 2020-12-05 RX ADMIN — Medication 20 MILLIGRAM(S): at 05:07

## 2020-12-05 RX ADMIN — Medication 6 MILLIGRAM(S): at 05:05

## 2020-12-05 RX ADMIN — REMDESIVIR 500 MILLIGRAM(S): 5 INJECTION INTRAVENOUS at 15:26

## 2020-12-05 RX ADMIN — Medication 1 MILLIGRAM(S): at 11:38

## 2020-12-05 RX ADMIN — CHLORHEXIDINE GLUCONATE 1 APPLICATION(S): 213 SOLUTION TOPICAL at 05:18

## 2020-12-05 RX ADMIN — AMLODIPINE BESYLATE 10 MILLIGRAM(S): 2.5 TABLET ORAL at 05:07

## 2020-12-05 RX ADMIN — Medication 81 MILLIGRAM(S): at 11:39

## 2020-12-05 NOTE — PROGRESS NOTE ADULT - SUBJECTIVE AND OBJECTIVE BOX
Admit Date: 12-02-20 (3d)    Chief Complaint:  Patient is a 85y old  Male who presents with a chief complaint of Syncope (04 Dec 2020 17:08)      Past Medical and Surgical History:  PAST MEDICAL & SURGICAL HISTORY:  HLD (hyperlipidemia)    CAD (coronary artery disease)    Polio    CHF (congestive heart failure)    Hypothyroid    History of surgery  cardiac stents    History of bilateral knee replacement    H/O bilateral hip replacements        Current Medications:  MEDICATIONS  (STANDING):  amLODIPine   Tablet 10 milliGRAM(s) Oral daily  aspirin  chewable 81 milliGRAM(s) Oral daily  chlorhexidine 4% Liquid 1 Application(s) Topical <User Schedule>  dexAMETHasone  Injectable 6 milliGRAM(s) IV Push daily  folic acid 1 milliGRAM(s) Oral daily  furosemide    Tablet 20 milliGRAM(s) Oral daily  heparin   Injectable 5000 Unit(s) SubCutaneous every 8 hours  levothyroxine 25 MICROGram(s) Oral daily  pantoprazole    Tablet 40 milliGRAM(s) Oral before breakfast  remdesivir  IVPB   IV Intermittent   remdesivir  IVPB 100 milliGRAM(s) IV Intermittent every 24 hours  simvastatin 40 milliGRAM(s) Oral at bedtime  tiotropium 18 MICROgram(s) Capsule 1 Capsule(s) Inhalation daily    MEDICATIONS  (PRN):    Interval History:  No acute events overnight. No complaints at this time.    Vital Signs:  T(F): 96 (12-05-20 @ 13:43), Max: 97.7 (12-03-20 @ 21:26)  HR: 81 (12-05-20 @ 13:43) (79 - 89)  BP: 146/74 (12-05-20 @ 13:43) (123/86 - 159/90)  RR: 18 (12-05-20 @ 13:43) (18 - 18)  SpO2: 97% (12-05-20 @ 04:58) (87% - 97%)  CAPILLARY BLOOD GLUCOSE        Physical Exam:  General: Not in distress.   HEENT: Moist mucus membranes. PERRLA.  Cardio: Regular rate and rhythm, S1, S2, no murmur, rub, or gallop.  Pulm: Clear to auscultation bilaterally. No wheezing, rales, or rhonchi.  Abdomen: Soft, non-tender, non-distended. Normoactive bowel sounds.  Extremities: No cyanosis or edema bilaterally. No calf tenderness to palpation.  Neuro: A&O x3.     Labs and Imaging:  CBC Full  -  ( 05 Dec 2020 08:05 )  WBC Count : 8.24 K/uL  RBC Count : 6.37 M/uL  Hemoglobin : 16.5 g/dL  Hematocrit : 50.8 %  Platelet Count - Automated : 132 K/uL  Mean Cell Volume : 79.7 fL  Mean Cell Hemoglobin : 25.9 pg  Mean Cell Hemoglobin Concentration : 32.5 g/dL  Auto Neutrophil # : x  Auto Lymphocyte # : x  Auto Monocyte # : x  Auto Eosinophil # : x  Auto Basophil # : x  Auto Neutrophil % : x  Auto Lymphocyte % : x  Auto Monocyte % : x  Auto Eosinophil % : x  Auto Basophil % : x    RDW: 14.3      BMP: 12-05-20 @ 08:05  137 | 102 | 38   -----------------< 159  4.7  | 22 | 1.6  eGFR(AA): 45, eGFR (non-AA): 39  Ca 8.2, Mg 2.1, P --    LFTs: 12-04-20 @ 09:55  TP  6.7  | 3.6 Alb   ---------------  TB  0.4  | <0.2 DB   ---------------  ALT 25  | 30  AST            ^          81  ALK  LFTs: 12-04-20 @ 04:30  TP  6.8  | 3.4 Alb   ---------------  TB  0.4  | --  DB   ---------------  ALT 25  | 29  AST            ^          84  ALK      LFTs: 12-05-20 @ 08:05  Ca  8.2  | -- AST   -----------------  TP  --  | -- ALT  -----------------  Alb --  | -- ALK          ^        --         TB      Cardiac Enzymes:    Urinalysis:    Cultures:     (collected 12-02-20 @ 04:15)  Source: .Urine Clean Catch (Midstream)  Final Report:    <10,000 CFU/mL Normal Urogenital Temi        Home Medications:  Home Medications:  amlodipine-benazepril 5 mg-10 mg oral capsule: 1 cap(s) orally once a day (02 Dec 2020 06:26)  aspirin 81 mg oral tablet, chewable: 1 tab(s) orally once a day (02 Dec 2020 06:26)  folic acid: 1 milligram(s) orally once a day (02 Dec 2020 06:26)  furosemide 20 mg oral tablet: 1 tab(s) orally once a day (02 Dec 2020 06:26)  simvastatin 40 mg oral tablet: 1 tab(s) orally once a day (at bedtime) (02 Dec 2020 06:26)  Spiriva 18 mcg inhalation capsule: 1 cap(s) inhaled once a day (02 Dec 2020 06:26)  Synthroid 25 mcg (0.025 mg) oral tablet: 1 tab(s) orally once a day (02 Dec 2020 06:26)

## 2020-12-05 NOTE — PROGRESS NOTE ADULT - ATTENDING COMMENTS
Patient seen and examined    #Unwitnessed fall/Syncope: Pan CT scan negative for acute trauma  - EEG: WNL (no need for MRI) as per neuro  - Carotid Duplex: Mild 20-39% stenosis bilaterally    - EKG: NSR    #COVID-19 Pneumonia: Patient maintaining saturation of 97% on room air, on day 3 of Remdesivir and Dexamethasone     #LOLI on CKD III, resolved     #suspected HFpEF- stable- unable to get echo    #CAD s/p stents x2/HLD: continue home medications     #HTN: continue lasix, amlodipine     #Ascending Thoracic Aortic Aneurysm on CT scan: outpatient Vascular follow    #Severe Cervical spinal stenosis: patient asymptomatic    Disposition: to complete Remdesivir course Patient seen and examined, patient endorses shortness of breath at times, feels winded, discussed case with patient's daughter, Marylou    #Unwitnessed fall/Syncope: Pan CT scan negative for acute trauma  - EEG: WNL (no need for MRI) as per neuro  - Carotid Duplex: Mild 20-39% stenosis bilaterally    - EKG: NSR    #COVID-19 Pneumonia: Patient maintaining saturation of 97% on room air, on day 3 of Remdesivir and Dexamethasone     #LOLI on CKD III, resolved     #suspected HFpEF- stable- unable to get echo    #CAD s/p stents x2/HLD: continue home medications     #HTN: continue lasix, amlodipine     #Ascending Thoracic Aortic Aneurysm on CT scan: outpatient Vascular follow    #Severe Cervical spinal stenosis: patient asymptomatic    Disposition: to complete Remdesivir course, Acute

## 2020-12-06 LAB
ANION GAP SERPL CALC-SCNC: 12 MMOL/L — SIGNIFICANT CHANGE UP (ref 7–14)
BUN SERPL-MCNC: 40 MG/DL — HIGH (ref 10–20)
CALCIUM SERPL-MCNC: 8.3 MG/DL — LOW (ref 8.5–10.1)
CHLORIDE SERPL-SCNC: 103 MMOL/L — SIGNIFICANT CHANGE UP (ref 98–110)
CO2 SERPL-SCNC: 23 MMOL/L — SIGNIFICANT CHANGE UP (ref 17–32)
CREAT SERPL-MCNC: 1.7 MG/DL — HIGH (ref 0.7–1.5)
GLUCOSE SERPL-MCNC: 251 MG/DL — HIGH (ref 70–99)
HCT VFR BLD CALC: 51 % — SIGNIFICANT CHANGE UP (ref 42–52)
HGB BLD-MCNC: 16.4 G/DL — SIGNIFICANT CHANGE UP (ref 14–18)
MCHC RBC-ENTMCNC: 25.9 PG — LOW (ref 27–31)
MCHC RBC-ENTMCNC: 32.2 G/DL — SIGNIFICANT CHANGE UP (ref 32–37)
MCV RBC AUTO: 80.4 FL — SIGNIFICANT CHANGE UP (ref 80–94)
NRBC # BLD: 0 /100 WBCS — SIGNIFICANT CHANGE UP (ref 0–0)
PLATELET # BLD AUTO: 177 K/UL — SIGNIFICANT CHANGE UP (ref 130–400)
POTASSIUM SERPL-MCNC: 4.4 MMOL/L — SIGNIFICANT CHANGE UP (ref 3.5–5)
POTASSIUM SERPL-SCNC: 4.4 MMOL/L — SIGNIFICANT CHANGE UP (ref 3.5–5)
RBC # BLD: 6.34 M/UL — HIGH (ref 4.7–6.1)
RBC # FLD: 14 % — SIGNIFICANT CHANGE UP (ref 11.5–14.5)
SODIUM SERPL-SCNC: 138 MMOL/L — SIGNIFICANT CHANGE UP (ref 135–146)
WBC # BLD: 7.02 K/UL — SIGNIFICANT CHANGE UP (ref 4.8–10.8)
WBC # FLD AUTO: 7.02 K/UL — SIGNIFICANT CHANGE UP (ref 4.8–10.8)

## 2020-12-06 PROCEDURE — 99233 SBSQ HOSP IP/OBS HIGH 50: CPT

## 2020-12-06 RX ADMIN — Medication 6 MILLIGRAM(S): at 06:20

## 2020-12-06 RX ADMIN — Medication 1 MILLIGRAM(S): at 11:42

## 2020-12-06 RX ADMIN — SIMVASTATIN 40 MILLIGRAM(S): 20 TABLET, FILM COATED ORAL at 21:49

## 2020-12-06 RX ADMIN — TIOTROPIUM BROMIDE 1 CAPSULE(S): 18 CAPSULE ORAL; RESPIRATORY (INHALATION) at 16:28

## 2020-12-06 RX ADMIN — Medication 20 MILLIGRAM(S): at 06:17

## 2020-12-06 RX ADMIN — CHLORHEXIDINE GLUCONATE 1 APPLICATION(S): 213 SOLUTION TOPICAL at 06:21

## 2020-12-06 RX ADMIN — HEPARIN SODIUM 5000 UNIT(S): 5000 INJECTION INTRAVENOUS; SUBCUTANEOUS at 16:28

## 2020-12-06 RX ADMIN — AMLODIPINE BESYLATE 10 MILLIGRAM(S): 2.5 TABLET ORAL at 06:18

## 2020-12-06 RX ADMIN — PANTOPRAZOLE SODIUM 40 MILLIGRAM(S): 20 TABLET, DELAYED RELEASE ORAL at 06:18

## 2020-12-06 RX ADMIN — Medication 25 MICROGRAM(S): at 06:18

## 2020-12-06 RX ADMIN — REMDESIVIR 500 MILLIGRAM(S): 5 INJECTION INTRAVENOUS at 16:28

## 2020-12-06 RX ADMIN — Medication 81 MILLIGRAM(S): at 11:42

## 2020-12-06 NOTE — PROGRESS NOTE ADULT - SUBJECTIVE AND OBJECTIVE BOX
Admit Date: 12-02-20 (4d)    Chief Complaint:  Patient is a 85y old  Male who presents with a chief complaint of Syncope (05 Dec 2020 17:29)      Past Medical and Surgical History:  PAST MEDICAL & SURGICAL HISTORY:  HLD (hyperlipidemia)    CAD (coronary artery disease)    Polio    CHF (congestive heart failure)    Hypothyroid    History of surgery  cardiac stents    History of bilateral knee replacement    H/O bilateral hip replacements        Current Medications:  MEDICATIONS  (STANDING):  amLODIPine   Tablet 10 milliGRAM(s) Oral daily  aspirin  chewable 81 milliGRAM(s) Oral daily  chlorhexidine 4% Liquid 1 Application(s) Topical <User Schedule>  dexAMETHasone  Injectable 6 milliGRAM(s) IV Push daily  folic acid 1 milliGRAM(s) Oral daily  furosemide    Tablet 20 milliGRAM(s) Oral daily  heparin   Injectable 5000 Unit(s) SubCutaneous every 8 hours  levothyroxine 25 MICROGram(s) Oral daily  pantoprazole    Tablet 40 milliGRAM(s) Oral before breakfast  remdesivir  IVPB   IV Intermittent   remdesivir  IVPB 100 milliGRAM(s) IV Intermittent every 24 hours  simvastatin 40 milliGRAM(s) Oral at bedtime  tiotropium 18 MICROgram(s) Capsule 1 Capsule(s) Inhalation daily    MEDICATIONS  (PRN):  acetaminophen   Tablet .. 650 milliGRAM(s) Oral every 6 hours PRN Moderate Pain (4 - 6)      Interval History:  No acute events overnight. No complaints at this time.    Vital Signs:  T(F): 96 (12-06-20 @ 05:27), Max: 97.4 (12-05-20 @ 00:18)  HR: 88 (12-05-20 @ 21:23) (79 - 88)  BP: 154/95 (12-06-20 @ 05:27) (123/86 - 154/95)  RR: 18 (12-06-20 @ 05:27) (18 - 18)  SpO2: 97% (12-05-20 @ 21:23) (88% - 97%)  CAPILLARY BLOOD GLUCOSE          Physical Exam:  General: Not in distress.   HEENT: Moist mucus membranes. PERRLA.  Cardio: Regular rate and rhythm, S1, S2, no murmur, rub, or gallop.  Pulm: Clear to auscultation bilaterally.   Abdomen: Soft, non-tender, non-distended. Normoactive bowel sounds.  Extremities: No cyanosis or edema bilaterally. No calf tenderness to palpation.  Neuro: A&O x3.     Labs and Imaging:  CBC Full  -  ( 05 Dec 2020 08:05 )  WBC Count : 8.24 K/uL  RBC Count : 6.37 M/uL  Hemoglobin : 16.5 g/dL  Hematocrit : 50.8 %  Platelet Count - Automated : 132 K/uL  Mean Cell Volume : 79.7 fL  Mean Cell Hemoglobin : 25.9 pg  Mean Cell Hemoglobin Concentration : 32.5 g/dL  Auto Neutrophil # : x  Auto Lymphocyte # : x  Auto Monocyte # : x  Auto Eosinophil # : x  Auto Basophil # : x  Auto Neutrophil % : x  Auto Lymphocyte % : x  Auto Monocyte % : x  Auto Eosinophil % : x  Auto Basophil % : x    RDW:               Cardiac Enzymes:    Urinalysis:    Cultures:     (collected 12-02-20 @ 04:15)  Source: .Urine Clean Catch (Midstream)  Final Report:    <10,000 CFU/mL Normal Urogenital Temi        Home Medications:  Home Medications:  amlodipine-benazepril 5 mg-10 mg oral capsule: 1 cap(s) orally once a day (02 Dec 2020 06:26)  aspirin 81 mg oral tablet, chewable: 1 tab(s) orally once a day (02 Dec 2020 06:26)  folic acid: 1 milligram(s) orally once a day (02 Dec 2020 06:26)  furosemide 20 mg oral tablet: 1 tab(s) orally once a day (02 Dec 2020 06:26)  simvastatin 40 mg oral tablet: 1 tab(s) orally once a day (at bedtime) (02 Dec 2020 06:26)  Spiriva 18 mcg inhalation capsule: 1 cap(s) inhaled once a day (02 Dec 2020 06:26)  Synthroid 25 mcg (0.025 mg) oral tablet: 1 tab(s) orally once a day (02 Dec 2020 06:26)

## 2020-12-06 NOTE — PROGRESS NOTE ADULT - ATTENDING COMMENTS
Patient seen and examined, patient denies shortness of breath or chest pain, states he is comfortable today.     #Unwitnessed fall/Syncope: Pan CT scan negative for acute trauma  - EEG: WNL (no need for MRI) as per neuro  - Carotid Duplex: Mild 20-39% stenosis bilaterally    - EKG: NSR    #COVID-19 Pneumonia: Patient maintaining saturation of 97% on room air, on day 4 of Remdesivir and Dexamethasone     #LOLI on CKD III, resolved     #suspected HFpEF- stable- unable to get echo    #CAD s/p stents x2/HLD: continue home medications     #HTN: continue lasix, amlodipine     #Ascending Thoracic Aortic Aneurysm on CT scan: outpatient Vascular follow    #Severe Cervical spinal stenosis: patient asymptomatic    Disposition: Home s/p completion of Remdesivir course

## 2020-12-07 LAB
ANION GAP SERPL CALC-SCNC: 10 MMOL/L — SIGNIFICANT CHANGE UP (ref 7–14)
BUN SERPL-MCNC: 35 MG/DL — HIGH (ref 10–20)
CALCIUM SERPL-MCNC: 8.2 MG/DL — LOW (ref 8.5–10.1)
CHLORIDE SERPL-SCNC: 104 MMOL/L — SIGNIFICANT CHANGE UP (ref 98–110)
CO2 SERPL-SCNC: 24 MMOL/L — SIGNIFICANT CHANGE UP (ref 17–32)
CREAT SERPL-MCNC: 1.5 MG/DL — SIGNIFICANT CHANGE UP (ref 0.7–1.5)
GLUCOSE SERPL-MCNC: 165 MG/DL — HIGH (ref 70–99)
HCT VFR BLD CALC: 51.9 % — SIGNIFICANT CHANGE UP (ref 42–52)
HGB BLD-MCNC: 16.7 G/DL — SIGNIFICANT CHANGE UP (ref 14–18)
MCHC RBC-ENTMCNC: 26.1 PG — LOW (ref 27–31)
MCHC RBC-ENTMCNC: 32.2 G/DL — SIGNIFICANT CHANGE UP (ref 32–37)
MCV RBC AUTO: 81 FL — SIGNIFICANT CHANGE UP (ref 80–94)
NRBC # BLD: 0 /100 WBCS — SIGNIFICANT CHANGE UP (ref 0–0)
PLATELET # BLD AUTO: 177 K/UL — SIGNIFICANT CHANGE UP (ref 130–400)
POTASSIUM SERPL-MCNC: 4.4 MMOL/L — SIGNIFICANT CHANGE UP (ref 3.5–5)
POTASSIUM SERPL-SCNC: 4.4 MMOL/L — SIGNIFICANT CHANGE UP (ref 3.5–5)
RBC # BLD: 6.41 M/UL — HIGH (ref 4.7–6.1)
RBC # FLD: 14.1 % — SIGNIFICANT CHANGE UP (ref 11.5–14.5)
SODIUM SERPL-SCNC: 138 MMOL/L — SIGNIFICANT CHANGE UP (ref 135–146)
WBC # BLD: 7.19 K/UL — SIGNIFICANT CHANGE UP (ref 4.8–10.8)
WBC # FLD AUTO: 7.19 K/UL — SIGNIFICANT CHANGE UP (ref 4.8–10.8)

## 2020-12-07 PROCEDURE — 99232 SBSQ HOSP IP/OBS MODERATE 35: CPT

## 2020-12-07 RX ADMIN — SIMVASTATIN 40 MILLIGRAM(S): 20 TABLET, FILM COATED ORAL at 21:40

## 2020-12-07 RX ADMIN — Medication 81 MILLIGRAM(S): at 11:04

## 2020-12-07 RX ADMIN — Medication 1 MILLIGRAM(S): at 11:04

## 2020-12-07 RX ADMIN — Medication 25 MICROGRAM(S): at 06:22

## 2020-12-07 RX ADMIN — Medication 6 MILLIGRAM(S): at 06:23

## 2020-12-07 RX ADMIN — Medication 20 MILLIGRAM(S): at 06:21

## 2020-12-07 RX ADMIN — AMLODIPINE BESYLATE 10 MILLIGRAM(S): 2.5 TABLET ORAL at 06:21

## 2020-12-07 RX ADMIN — REMDESIVIR 500 MILLIGRAM(S): 5 INJECTION INTRAVENOUS at 14:59

## 2020-12-07 RX ADMIN — PANTOPRAZOLE SODIUM 40 MILLIGRAM(S): 20 TABLET, DELAYED RELEASE ORAL at 06:22

## 2020-12-07 RX ADMIN — CHLORHEXIDINE GLUCONATE 1 APPLICATION(S): 213 SOLUTION TOPICAL at 06:22

## 2020-12-07 NOTE — PROGRESS NOTE ADULT - SUBJECTIVE AND OBJECTIVE BOX
OVERNIGHT EVENTS:  No acute events overnight.    SUBJECTIVE / INTERVAL HPI: Patient seen and examined at bedside. Comfortable, no complaints. Denies fever/chills, cough, or SOB.       VITAL SIGNS:  Vital Signs Last 24 Hrs  T(C): 35.7 (07 Dec 2020 05:54), Max: 36 (06 Dec 2020 21:29)  T(F): 96.2 (07 Dec 2020 05:54), Max: 96.8 (06 Dec 2020 21:29)  HR: 76 (07 Dec 2020 05:54) (71 - 76)  BP: 139/99 (07 Dec 2020 05:54) (104/61 - 143/95)  RR: 18 (07 Dec 2020 05:54) (18 - 19)  SpO2: 93% (07 Dec 2020 08:00) (93% - 96%)    PHYSICAL EXAM:  General: sitting up in bed, NAD  HEENT: NC/AT, PERRL, anicteric sclera; MMM  Neck: supple  Cardiovascular: +S1/S2, RRR  Respiratory: CTA B/L  Gastrointestinal: soft, NT/ND, +BS  Extremities: WWP, no edema or cyanosis  Neurological: AAOx3, no focal deficits    MEDICATIONS:  MEDICATIONS  (STANDING):  amLODIPine   Tablet 10 milliGRAM(s) Oral daily  aspirin  chewable 81 milliGRAM(s) Oral daily  chlorhexidine 4% Liquid 1 Application(s) Topical <User Schedule>  dexAMETHasone  Injectable 6 milliGRAM(s) IV Push daily  folic acid 1 milliGRAM(s) Oral daily  furosemide    Tablet 20 milliGRAM(s) Oral daily  heparin   Injectable 5000 Unit(s) SubCutaneous every 8 hours  levothyroxine 25 MICROGram(s) Oral daily  pantoprazole    Tablet 40 milliGRAM(s) Oral before breakfast  remdesivir  IVPB   IV Intermittent   remdesivir  IVPB 100 milliGRAM(s) IV Intermittent every 24 hours  simvastatin 40 milliGRAM(s) Oral at bedtime  tiotropium 18 MICROgram(s) Capsule 1 Capsule(s) Inhalation daily    MEDICATIONS  (PRN):  acetaminophen   Tablet .. 650 milliGRAM(s) Oral every 6 hours PRN Moderate Pain (4 - 6)      ALLERGIES:  Allergies    OxyContin (Vomiting)    Intolerances        LABS:                        16.7   7.19  )-----------( 177      ( 07 Dec 2020 06:55 )             51.9     12-07    138  |  104  |  35<H>  ----------------------------<  165<H>  4.4   |  24  |  1.5    Ca    8.2<L>      07 Dec 2020 06:55      RADIOLOGY & ADDITIONAL TESTS: Reviewed. OVERNIGHT EVENTS:  No acute events overnight.    SUBJECTIVE / INTERVAL HPI: Patient seen and examined at bedside. Comfortable, no complaints. Denies fever/chills, cough, or SOB. He states he feels much better now. Off oxygen supplement.    VITAL SIGNS:  Vital Signs Last 24 Hrs  T(C): 35.7 (07 Dec 2020 05:54), Max: 36 (06 Dec 2020 21:29)  T(F): 96.2 (07 Dec 2020 05:54), Max: 96.8 (06 Dec 2020 21:29)  HR: 76 (07 Dec 2020 05:54) (71 - 76)  BP: 139/99 (07 Dec 2020 05:54) (104/61 - 143/95)  RR: 18 (07 Dec 2020 05:54) (18 - 19)  SpO2: 93% (07 Dec 2020 08:00) (93% - 96%)    PHYSICAL EXAM:  General: sitting up in bed, NAD  HEENT: NC/AT, PERRL, anicteric sclera; moist mucous membraines  Neck: supple  Cardiovascular: +S1/S2, RRR  Respiratory: CTA B/L  Gastrointestinal: soft, NT/ND, +BS  Extremities: no edema or cyanosis, no calf tenderness  Neurological: AAOx3, no focal deficits    MEDICATIONS:  MEDICATIONS  (STANDING):  amLODIPine   Tablet 10 milliGRAM(s) Oral daily  aspirin  chewable 81 milliGRAM(s) Oral daily  chlorhexidine 4% Liquid 1 Application(s) Topical <User Schedule>  dexAMETHasone  Injectable 6 milliGRAM(s) IV Push daily  folic acid 1 milliGRAM(s) Oral daily  furosemide    Tablet 20 milliGRAM(s) Oral daily  heparin   Injectable 5000 Unit(s) SubCutaneous every 8 hours  levothyroxine 25 MICROGram(s) Oral daily  pantoprazole    Tablet 40 milliGRAM(s) Oral before breakfast  remdesivir  IVPB   IV Intermittent   remdesivir  IVPB 100 milliGRAM(s) IV Intermittent every 24 hours  simvastatin 40 milliGRAM(s) Oral at bedtime  tiotropium 18 MICROgram(s) Capsule 1 Capsule(s) Inhalation daily    MEDICATIONS  (PRN):  acetaminophen   Tablet .. 650 milliGRAM(s) Oral every 6 hours PRN Moderate Pain (4 - 6)      ALLERGIES:  Allergies    OxyContin (Vomiting)    Intolerances        LABS:                        16.7   7.19  )-----------( 177      ( 07 Dec 2020 06:55 )             51.9     12-07    138  |  104  |  35<H>  ----------------------------<  165<H>  4.4   |  24  |  1.5    Ca    8.2<L>      07 Dec 2020 06:55      RADIOLOGY & ADDITIONAL TESTS: Reviewed.

## 2020-12-07 NOTE — PROGRESS NOTE ADULT - ASSESSMENT
84 yo M with PMHx of HTN, HLD, CAD s/p stents x2, CHF, hypothyroidism, ?Polio and recent COVID19 diagnosis presents to the ED on 12/2 s/p unwitnessed fall/syncope.     # Unwitnessed fall/Syncope  -Pt states that he went to sleep and woke up about 1 hour later on the floor   - Pan CT scan negative for acute trauma  - CT cervical spine: severe stenosis @ multilevel  - EEG: WNL (no need for MRI) as per neuro  - Carotid Duplex: Mild 20-39% stenosis bilaterally    - EKG: NSR    #COVID-19  -reported dry cough, tested positive for covid about 3 days prior to arrival  -Patient maintaining saturation of 97% on room air  -CXR (PA/LA): No focal consolidation.  -RDV/Decadron: Day 5  -s/p 2 units plasma on 12/4    #LOLI on CKD III, resolved  - Baseline creatinine 1.7, was 2.1 on admission  - Cr 1.5 today     #suspected HFpEF- stable- unable to get echo  -continue home lasix 20mg daily    #CAD s/p stents x2/ HLD  - Cont ASA, Statin, not on bb? may benefit w/ low dose bb     # HTN  -BP controlled  -home benazepril was held due to LOLI  -continue amlodipine 10mg daily    # Ascending Thoracic Aortic Aneurysm on CT scan   -incidental finding- 4.5 cm ascending thoracic aortic aneurysm.  -o/p Vascular f/u    # Severe Cervical spinal stenosis - pt asymptomatic  -o/p follow up    # DVT ppx - heparin sq q8h  # GI ppx - PPI  # Diet - DASH  # Full code     #Dispo- PT rec home, pt refused home care services  Family to provide transportation on d/c 84 yo M with PMHx of HTN, HLD, CAD s/p stents x2, CHF, hypothyroidism, ?Polio and recent COVID19 diagnosis presents to the ED on 12/2 s/p unwitnessed fall/syncope.     # Unwitnessed fall/Syncope  - Pt states that he went to sleep and woke up about 1 hour later on the floor   - Pan CT scan negative for acute trauma  - CT cervical spine: severe stenosis at multilevel  - EEG: WNL (no need for MRI) as per neuro  - Carotid Duplex: Mild 20-39% stenosis bilaterally    - EKG: NSR    # COVID-19 PNA  -reported dry cough, tested positive for covid about 3 days prior to arrival  -Patient maintaining saturation of 97% on room air  -CXR (PA/LA): Left basilar opacity  -RDV/Decadron: Day 5  -s/p 2 units plasma on 12/4    #LOLI on CKD III, resolved  - Baseline creatinine 1.7, was 2.1 on admission  - Cr 1.5 today     #suspected HFpEF- stable- unable to get echo, clinically euvolemic  -continue home lasix 20mg daily    #CAD s/p stents x2/ HLD  - Cont ASA, Statin, not on bb? may benefit w/ low dose bb     # HTN  -BP controlled  -home benazepril was held due to LOLI, can resume upon discharge  -continue amlodipine 10mg daily    # Ascending Thoracic Aortic Aneurysm on CT scan   -incidental finding- 4.5 cm ascending thoracic aortic aneurysm.  -o/p Vascular f/u    # Severe Cervical spinal stenosis - pt asymptomatic  -o/p follow up    # DVT ppx - heparin sq q8h  # GI ppx - PPI  # Diet - DASH  # Full code     #Dispo- PT rec home PT, pt refused home care services  Family to provide transportation on d/c  Anticipated DC tomorrow

## 2020-12-07 NOTE — CHART NOTE - NSCHARTNOTEFT_GEN_A_CORE
Discussed the current progress and plan with patient's daughter, Marylou ([phone]). Informed her of anticipated plan for discharge tomorrow with home PT services. All questions answered. Patient and family encouraged to contact PA with any further issues

## 2020-12-08 ENCOUNTER — TRANSCRIPTION ENCOUNTER (OUTPATIENT)
Age: 85
End: 2020-12-08

## 2020-12-08 VITALS
HEART RATE: 77 BPM | SYSTOLIC BLOOD PRESSURE: 145 MMHG | TEMPERATURE: 97 F | RESPIRATION RATE: 18 BRPM | OXYGEN SATURATION: 94 % | DIASTOLIC BLOOD PRESSURE: 84 MMHG

## 2020-12-08 PROCEDURE — 99239 HOSP IP/OBS DSCHRG MGMT >30: CPT

## 2020-12-08 RX ORDER — DEXAMETHASONE 0.5 MG/5ML
1 ELIXIR ORAL
Qty: 5 | Refills: 0
Start: 2020-12-08 | End: 2020-12-12

## 2020-12-08 RX ADMIN — Medication 1 MILLIGRAM(S): at 11:04

## 2020-12-08 RX ADMIN — TIOTROPIUM BROMIDE 1 CAPSULE(S): 18 CAPSULE ORAL; RESPIRATORY (INHALATION) at 10:00

## 2020-12-08 RX ADMIN — Medication 20 MILLIGRAM(S): at 05:40

## 2020-12-08 RX ADMIN — Medication 25 MICROGRAM(S): at 05:40

## 2020-12-08 RX ADMIN — Medication 6 MILLIGRAM(S): at 05:40

## 2020-12-08 RX ADMIN — Medication 81 MILLIGRAM(S): at 11:04

## 2020-12-08 RX ADMIN — PANTOPRAZOLE SODIUM 40 MILLIGRAM(S): 20 TABLET, DELAYED RELEASE ORAL at 06:45

## 2020-12-08 RX ADMIN — AMLODIPINE BESYLATE 10 MILLIGRAM(S): 2.5 TABLET ORAL at 05:40

## 2020-12-08 NOTE — DISCHARGE NOTE NURSING/CASE MANAGEMENT/SOCIAL WORK - NSDCPNINST_GEN_ALL_CORE
If any complaints of nausea, vomiting, fever, shortness of breath or change in mental status call primary MD or return to emergency room

## 2020-12-08 NOTE — PROGRESS NOTE ADULT - ASSESSMENT
86 yo M with PMHx of HTN, HLD, CAD s/p stents x2, CHF, hypothyroidism, ?Polio and recent COVID19 diagnosis presents to the ED on 12/2 s/p unwitnessed fall/syncope.     # Unwitnessed fall/Syncope  - Pt states that he went to sleep and woke up about 1 hour later on the floor   - Pan CT scan negative for acute trauma  - CT cervical spine: severe stenosis at multilevel  - EEG: WNL (no need for MRI) as per neuro  - Carotid Duplex: Mild 20-39% stenosis bilaterally    - EKG: NSR    # COVID-19 PNA  -reported dry cough, tested positive for covid about 3 days prior to arrival  -Patient maintaining saturation of 96% on room air  -CXR (PA/LA): Left basilar opacity  -RDV/Decadron: Day 6  -s/p 2 units plasma on 12/4    #LOLI on CKD III, resolved  - Baseline creatinine 1.7, was 2.1 on admission  - Cr 1.5 today , will f/u today labs     #suspected HFpEF- stable- unable to get echo, clinically euvolemic  -continue home lasix 20mg daily    #CAD s/p stents x2/ HLD  - Cont ASA, Statin, not on bb? may benefit w/ low dose bb     # HTN  -BP controlled  -home benazepril was held due to LOLI, can resume upon discharge  -continue amlodipine 10mg daily    # Ascending Thoracic Aortic Aneurysm on CT scan   -incidental finding- 4.5 cm ascending thoracic aortic aneurysm.  -o/p Vascular f/u    # Severe Cervical spinal stenosis - pt asymptomatic  -o/p follow up    # DVT ppx - heparin sq q8h  # GI ppx - PPI  # Diet - DASH  # Full code     #Dispo- PT rec home PT, pt refused home care services  Family to provide transportation on d/c  Anticipated DC today 86 yo M with PMHx of HTN, HLD, CAD s/p stents x2, CHF, hypothyroidism, ?Polio and recent COVID19 diagnosis presents to the ED on 12/2 s/p unwitnessed fall/syncope.     # Unwitnessed fall/Syncope  - Pt states that he went to sleep and woke up about 1 hour later on the floor   - Pan CT scan negative for acute trauma  - CT cervical spine: severe stenosis at multilevel  - EEG: WNL (no need for MRI) as per neuro  - Carotid Duplex: Mild 20-39% stenosis bilaterally    - EKG: NSR  - if another episode of syncope pt will need loop/event recorder    # COVID-19 PNA  -reported dry cough, tested positive for covid about 3 days prior to arrival  -Patient maintaining saturation of 96% on room air  -CXR (PA/LA): Left basilar opacity  -RDV- completed 5 days, Decadron: Day 6, will complete 10 day  -s/p 2 units plasma on 12/4    #LOLI on CKD III, resolved  - Baseline creatinine 1.7, was 2.1 on admission  - Cr 1.5 today , will f/u today labs     #suspected HFpEF- stable- unable to get echo, clinically euvolemic  -continue home lasix 20mg daily    #CAD s/p stents x2/ HLD  - Cont ASA, Statin, not on bb? may benefit w/ low dose bb     # HTN  -BP controlled  -home benazepril was held due to LOLI, can resume upon discharge  -continue amlodipine 10mg daily    # Ascending Thoracic Aortic Aneurysm on CT scan   -incidental finding- 4.5 cm ascending thoracic aortic aneurysm.  -o/p Vascular f/u    # Severe Cervical spinal stenosis - pt asymptomatic  -o/p follow up    # DVT ppx - heparin sq q8h  # GI ppx - PPI  # Diet - DASH  # Full code     #Dispo- PT rec home PT, pt refused home care services  Family to provide transportation on d/c  Pt is clinically stable for discharge home today. Spoke to daughter Marylou.

## 2020-12-08 NOTE — DISCHARGE NOTE NURSING/CASE MANAGEMENT/SOCIAL WORK - PATIENT PORTAL LINK FT
You can access the FollowMyHealth Patient Portal offered by [facility] by registering at the following website: [email or web address]. By joining [employer]’s FollowMyHealth portal, you will also be able to view your health information using other applications (apps) compatible with our system.

## 2020-12-08 NOTE — PROGRESS NOTE ADULT - SUBJECTIVE AND OBJECTIVE BOX
OVERNIGHT EVENTS:  No acute events overnight. NO Oxygen needed over night     SUBJECTIVE / INTERVAL HPI: Patient seen and examined at bedside. Comfortable, no complaints. Denies fever/chills, cough, or SOB. He states he feels much better now. Off oxygen supplement.    Vital Signs Last 24 Hrs  T(C): 35.6 (08 Dec 2020 05:58), Max: 36.4 (07 Dec 2020 21:25)  T(F): 96.1 (08 Dec 2020 05:58), Max: 97.6 (07 Dec 2020 21:25)  HR: 73 (08 Dec 2020 05:58) (70 - 73)  BP: 167/90 (08 Dec 2020 05:58) (137/85 - 167/90)  BP(mean): --  RR: 18 (08 Dec 2020 05:58) (18 - 18)  SpO2: 93% (08 Dec 2020 05:58) (93% - 96%)    PHYSICAL EXAM:  General: sitting up in bed, NAD  HEENT: NC/AT, PERRL, anicteric sclera; moist mucous membranes  Neck: supple  Cardiovascular: +S1/S2, RRR  Respiratory: CTA B/L  Gastrointestinal: soft, NT/ND, +BS  Extremities: no edema or cyanosis, no calf tenderness  Neurological: AAOx3, no focal deficits    MEDICATIONS:  MEDICATIONS  (STANDING):  amLODIPine   Tablet 10 milliGRAM(s) Oral daily  aspirin  chewable 81 milliGRAM(s) Oral daily  chlorhexidine 4% Liquid 1 Application(s) Topical <User Schedule>  dexAMETHasone  Injectable 6 milliGRAM(s) IV Push daily  folic acid 1 milliGRAM(s) Oral daily  furosemide    Tablet 20 milliGRAM(s) Oral daily  heparin   Injectable 5000 Unit(s) SubCutaneous every 8 hours  levothyroxine 25 MICROGram(s) Oral daily  pantoprazole    Tablet 40 milliGRAM(s) Oral before breakfast  remdesivir  IVPB   IV Intermittent   remdesivir  IVPB 100 milliGRAM(s) IV Intermittent every 24 hours  simvastatin 40 milliGRAM(s) Oral at bedtime  tiotropium 18 MICROgram(s) Capsule 1 Capsule(s) Inhalation daily    MEDICATIONS  (PRN):  acetaminophen   Tablet .. 650 milliGRAM(s) Oral every 6 hours PRN Moderate Pain (4 - 6)      ALLERGIES:  Allergies    OxyContin (Vomiting)    Intolerances        LABS:             pending today       RADIOLOGY & ADDITIONAL TESTS: Reviewed.

## 2020-12-17 DIAGNOSIS — Z88.8 ALLERGY STATUS TO OTHER DRUGS, MEDICAMENTS AND BIOLOGICAL SUBSTANCES: ICD-10-CM

## 2020-12-17 DIAGNOSIS — Z96.643 PRESENCE OF ARTIFICIAL HIP JOINT, BILATERAL: ICD-10-CM

## 2020-12-17 DIAGNOSIS — I50.30 UNSPECIFIED DIASTOLIC (CONGESTIVE) HEART FAILURE: ICD-10-CM

## 2020-12-17 DIAGNOSIS — A41.89 OTHER SPECIFIED SEPSIS: ICD-10-CM

## 2020-12-17 DIAGNOSIS — R55 SYNCOPE AND COLLAPSE: ICD-10-CM

## 2020-12-17 DIAGNOSIS — J98.11 ATELECTASIS: ICD-10-CM

## 2020-12-17 DIAGNOSIS — I13.0 HYPERTENSIVE HEART AND CHRONIC KIDNEY DISEASE WITH HEART FAILURE AND STAGE 1 THROUGH STAGE 4 CHRONIC KIDNEY DISEASE, OR UNSPECIFIED CHRONIC KIDNEY DISEASE: ICD-10-CM

## 2020-12-17 DIAGNOSIS — G83.24 MONOPLEGIA OF UPPER LIMB AFFECTING LEFT NONDOMINANT SIDE: ICD-10-CM

## 2020-12-17 DIAGNOSIS — Z86.12 PERSONAL HISTORY OF POLIOMYELITIS: ICD-10-CM

## 2020-12-17 DIAGNOSIS — E78.5 HYPERLIPIDEMIA, UNSPECIFIED: ICD-10-CM

## 2020-12-17 DIAGNOSIS — I71.2 THORACIC AORTIC ANEURYSM, WITHOUT RUPTURE: ICD-10-CM

## 2020-12-17 DIAGNOSIS — J12.89 OTHER VIRAL PNEUMONIA: ICD-10-CM

## 2020-12-17 DIAGNOSIS — U07.1 COVID-19: ICD-10-CM

## 2020-12-17 DIAGNOSIS — Z96.9 PRESENCE OF FUNCTIONAL IMPLANT, UNSPECIFIED: ICD-10-CM

## 2020-12-17 DIAGNOSIS — Z95.5 PRESENCE OF CORONARY ANGIOPLASTY IMPLANT AND GRAFT: ICD-10-CM

## 2020-12-17 DIAGNOSIS — M48.02 SPINAL STENOSIS, CERVICAL REGION: ICD-10-CM

## 2020-12-17 DIAGNOSIS — I25.10 ATHEROSCLEROTIC HEART DISEASE OF NATIVE CORONARY ARTERY WITHOUT ANGINA PECTORIS: ICD-10-CM

## 2020-12-17 DIAGNOSIS — Z00.6 ENCOUNTER FOR EXAMINATION FOR NORMAL COMPARISON AND CONTROL IN CLINICAL RESEARCH PROGRAM: ICD-10-CM

## 2020-12-17 DIAGNOSIS — Z96.653 PRESENCE OF ARTIFICIAL KNEE JOINT, BILATERAL: ICD-10-CM

## 2020-12-17 DIAGNOSIS — Z79.82 LONG TERM (CURRENT) USE OF ASPIRIN: ICD-10-CM

## 2020-12-17 DIAGNOSIS — N18.30 CHRONIC KIDNEY DISEASE, STAGE 3 UNSPECIFIED: ICD-10-CM
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
## 2020-12-17 DIAGNOSIS — E03.9 HYPOTHYROIDISM, UNSPECIFIED: ICD-10-CM
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
## 2020-12-17 DIAGNOSIS — N17.9 ACUTE KIDNEY FAILURE, UNSPECIFIED: ICD-10-CM

## 2021-09-17 PROBLEM — G47.33 OSA (OBSTRUCTIVE SLEEP APNEA): Status: ACTIVE | Noted: 2021-09-17

## 2021-09-17 PROBLEM — I10 HTN (HYPERTENSION): Status: ACTIVE | Noted: 2021-09-17

## 2021-09-17 PROBLEM — Z82.3 FAMILY HISTORY OF CEREBROVASCULAR ACCIDENT (CVA): Status: ACTIVE | Noted: 2021-09-17

## 2021-09-17 PROBLEM — Z82.49 FAMILY HISTORY OF CORONARY ARTERY DISEASE: Status: ACTIVE | Noted: 2021-09-17

## 2021-09-17 PROBLEM — I25.2 PAST MYOCARDIAL INFARCTION: Status: ACTIVE | Noted: 2021-09-17

## 2021-09-17 PROBLEM — A80.9 POLIO: Status: RESOLVED | Noted: 2021-09-17 | Resolved: 2021-09-17

## 2021-09-17 NOTE — REVIEW OF SYSTEMS
[Hearing Loss] : hearing loss [Fever] : no fever [Chills] : no chills [Blurry Vision] : no blurred vision [Earache] : no earache [Sore Throat] : no sore throat [SOB] : no shortness of breath [Chest Discomfort] : no chest discomfort [Palpitations] : no palpitations [Cough] : no cough [Wheezing] : no wheezing [Abdominal Pain] : no abdominal pain [Diarrhea] : diarrhea [Constipation] : no constipation [Urinary Frequency] : no change in urinary frequency [Erectile Dysfunction] : no erectile dysfunction [Joint Pain] : no joint pain [Myalgia] : no myalgia [Rash] : no rash [Skin Lesions] : no skin lesions [Dizziness] : no dizziness [Weakness] : no weakness [Confusion] : no confusion was observed [Swollen Glands] : no swollen glands

## 2021-09-17 NOTE — PHYSICAL EXAM
[Well Developed] : well developed [Well Nourished] : well nourished [No Acute Distress] : no acute distress [Normal Conjunctiva] : normal conjunctiva [Normal Venous Pressure] : normal venous pressure [No Carotid Bruit] : no carotid bruit [5th Left ICS - MCL] : palpated at the 5th LICS in the midclavicular line [Normal] : normal [No Precordial Heave] : no precordial heave was noted [Normal Rate] : normal [Rhythm Regular] : regular [Normal S1] : normal S1 [Normal S2] : normal S2 [I] : a grade 1 [No Pitting Edema] : no pitting edema present [2+] : left 2+ [No Abnormalities] : the abdominal aorta was not enlarged and no bruit was heard [Clear Lung Fields] : clear lung fields [Good Air Entry] : good air entry [No Respiratory Distress] : no respiratory distress  [Soft] : abdomen soft [Non Tender] : non-tender [No Masses/organomegaly] : no masses/organomegaly [Normal Bowel Sounds] : normal bowel sounds [No Edema] : no edema [Normal Gait] : normal gait [No Cyanosis] : no cyanosis [No Clubbing] : no clubbing [No Varicosities] : no varicosities [No Rash] : no rash [No Skin Lesions] : no skin lesions [Moves all extremities] : moves all extremities [No Focal Deficits] : no focal deficits [Normal Speech] : normal speech [Alert and Oriented] : alert and oriented [Normal memory] : normal memory [S3] : no S3 [S4] : no S4 [Right Carotid Bruit] : no bruit heard over the right carotid [Left Carotid Bruit] : no bruit heard over the left carotid [Right Femoral Bruit] : no bruit heard over the right femoral artery [Left Femoral Bruit] : no bruit heard over the left femoral artery

## 2021-09-17 NOTE — DISCUSSION/SUMMARY
[FreeTextEntry1] : Pt with CHF. He has normal LV fx. No overt CHF now. He had a DSE 10/19. Neg for ischemia. His echo showed normal LV fx.. He had MILD MR. Lungs followed by [facility]. He lost 4 lbs.Dr Mendosa did one hip and one knee. Gigi did one knee and hip. Still knee pain Told post polio. Told to try lose wt. He had covid PNA 1/21. To wait 3 mth for vaccine continue wt loss.

## 2021-09-23 ENCOUNTER — APPOINTMENT (OUTPATIENT)
Dept: CARDIOLOGY | Facility: CLINIC | Age: 86
End: 2021-09-23

## 2021-09-23 DIAGNOSIS — J12.82 COVID-19: ICD-10-CM

## 2021-09-23 DIAGNOSIS — I10 ESSENTIAL (PRIMARY) HYPERTENSION: ICD-10-CM

## 2021-09-23 DIAGNOSIS — U07.1 COVID-19: ICD-10-CM

## 2021-09-23 DIAGNOSIS — G47.33 OBSTRUCTIVE SLEEP APNEA (ADULT) (PEDIATRIC): ICD-10-CM

## 2021-09-23 DIAGNOSIS — I25.2 OLD MYOCARDIAL INFARCTION: ICD-10-CM

## 2021-09-23 DIAGNOSIS — Z82.49 FAMILY HISTORY OF ISCHEMIC HEART DISEASE AND OTHER DISEASES OF THE CIRCULATORY SYSTEM: ICD-10-CM

## 2021-09-23 DIAGNOSIS — Z82.3 FAMILY HISTORY OF STROKE: ICD-10-CM

## 2021-09-23 DIAGNOSIS — A80.9 ACUTE POLIOMYELITIS, UNSPECIFIED: ICD-10-CM

## 2021-10-06 PROBLEM — U07.1 PNEUMONIA DUE TO COVID-19 VIRUS: Status: ACTIVE | Noted: 2021-09-17

## 2021-12-13 NOTE — ED ADULT NURSE NOTE - NS ED NOTE  TALK SOMEONE YN
patient's estimste too high right now - does not want testing at this time.     Electronically signed by Dilip Arriaza on 12/13/2021 at 9:07 AM
No

## 2023-06-28 NOTE — ED PROVIDER NOTE - RESPIRATORY CHEST EXAM
Procedure:  COLONOSCOPY    Relevant Problems   No relevant active problems        Physical Exam    Airway    Mallampati score: II  TM Distance: >3 FB  Neck ROM: full     Dental       Cardiovascular      Pulmonary      Other Findings        Anesthesia Plan  ASA Score- 2     Anesthesia Type- IV sedation with anesthesia with ASA Monitors  Additional Monitors:   Airway Plan:           Plan Factors-Exercise tolerance (METS): >4 METS  Chart reviewed  Patient is not a current smoker  Patient did not smoke on day of surgery  Obstructive sleep apnea risk education given perioperatively  Induction- intravenous  Postoperative Plan-     Informed Consent- Anesthetic plan and risks discussed with patient  I personally reviewed this patient with the CRNA  Discussed and agreed on the Anesthesia Plan with the CRNA           NPO appropriate  Discussed benefits/risks of monitored anesthetic care and discussed providing a dynamic level of mild to deep sedation  Risks include awareness, airway obstruction, aspiration which may necessitate conversion to general anesthesia  All questions answered  Patient understands and wishes to proceed  Anesthesia plan and consent discussed with Stephanie Lester who expressed understanding and agreement  Risks/benefits and alternatives discussed with patient including possible PONV, sore throat, damage to teeth/lips/gums and possibility of rare anesthetic and surgical emergencies  USE OF ACCESSORY MUSCLES

## 2024-06-18 NOTE — PATIENT PROFILE ADULT - NSPRESCRALCSCORE_GEN_A_NUR_CAL
